# Patient Record
Sex: MALE | Race: OTHER
[De-identification: names, ages, dates, MRNs, and addresses within clinical notes are randomized per-mention and may not be internally consistent; named-entity substitution may affect disease eponyms.]

---

## 2018-02-01 ENCOUNTER — HOSPITAL ENCOUNTER (INPATIENT)
Dept: HOSPITAL 12 - SRC | Age: 23
LOS: 6 days | Discharge: OTHER INSTITUTION | DRG: 895 | End: 2018-02-07
Attending: INTERNAL MEDICINE | Admitting: INTERNAL MEDICINE
Payer: COMMERCIAL

## 2018-02-01 VITALS — SYSTOLIC BLOOD PRESSURE: 117 MMHG | DIASTOLIC BLOOD PRESSURE: 63 MMHG

## 2018-02-01 VITALS — HEIGHT: 74 IN | BODY MASS INDEX: 23.74 KG/M2 | WEIGHT: 185 LBS

## 2018-02-01 VITALS — SYSTOLIC BLOOD PRESSURE: 112 MMHG | DIASTOLIC BLOOD PRESSURE: 70 MMHG

## 2018-02-01 VITALS — SYSTOLIC BLOOD PRESSURE: 110 MMHG | DIASTOLIC BLOOD PRESSURE: 66 MMHG

## 2018-02-01 VITALS — DIASTOLIC BLOOD PRESSURE: 61 MMHG | SYSTOLIC BLOOD PRESSURE: 115 MMHG

## 2018-02-01 VITALS — SYSTOLIC BLOOD PRESSURE: 140 MMHG | DIASTOLIC BLOOD PRESSURE: 74 MMHG

## 2018-02-01 VITALS — DIASTOLIC BLOOD PRESSURE: 65 MMHG | SYSTOLIC BLOOD PRESSURE: 122 MMHG

## 2018-02-01 DIAGNOSIS — E07.81: ICD-10-CM

## 2018-02-01 DIAGNOSIS — Z81.1: ICD-10-CM

## 2018-02-01 DIAGNOSIS — B19.20: ICD-10-CM

## 2018-02-01 DIAGNOSIS — Z59.0: ICD-10-CM

## 2018-02-01 DIAGNOSIS — F17.210: ICD-10-CM

## 2018-02-01 DIAGNOSIS — Z59.1: ICD-10-CM

## 2018-02-01 DIAGNOSIS — F13.232: ICD-10-CM

## 2018-02-01 DIAGNOSIS — I15.9: ICD-10-CM

## 2018-02-01 DIAGNOSIS — Z91.89: ICD-10-CM

## 2018-02-01 DIAGNOSIS — Z81.8: ICD-10-CM

## 2018-02-01 DIAGNOSIS — Z91.14: ICD-10-CM

## 2018-02-01 DIAGNOSIS — F15.23: ICD-10-CM

## 2018-02-01 DIAGNOSIS — Z65.3: ICD-10-CM

## 2018-02-01 DIAGNOSIS — F31.9: ICD-10-CM

## 2018-02-01 DIAGNOSIS — F14.10: ICD-10-CM

## 2018-02-01 DIAGNOSIS — F11.23: Primary | ICD-10-CM

## 2018-02-01 LAB
ALP SERPL-CCNC: 65 U/L (ref 50–136)
ALT SERPL W/O P-5'-P-CCNC: 33 U/L (ref 16–63)
AMPHETAMINES UR QL SCN>1000 NG/ML: NEGATIVE
AMYLASE SERPL-CCNC: 29 U/L (ref 25–115)
AST SERPL-CCNC: 41 U/L (ref 15–37)
BARBITURATES UR QL SCN: POSITIVE
BASOPHILS # BLD AUTO: 0 K/UL (ref 0–8)
BASOPHILS NFR BLD AUTO: 0.3 % (ref 0–2)
BILIRUB SERPL-MCNC: 0.4 MG/DL (ref 0.2–1)
BUN SERPL-MCNC: 8 MG/DL (ref 7–18)
CHLORIDE SERPL-SCNC: 105 MMOL/L (ref 98–107)
CO2 SERPL-SCNC: 25 MMOL/L (ref 21–32)
COCAINE UR QL SCN: POSITIVE
CREAT SERPL-MCNC: 1 MG/DL (ref 0.6–1.3)
EOSINOPHIL # BLD AUTO: 0 K/UL (ref 0–0.7)
EOSINOPHIL NFR BLD AUTO: 1 % (ref 0–7)
ETHANOL SERPL-MCNC: < 3 MG/DL (ref 0–0)
GLUCOSE SERPL-MCNC: 82 MG/DL (ref 74–106)
HCT VFR BLD AUTO: 37.8 % (ref 36.7–47.1)
HGB BLD-MCNC: 12.9 G/DL (ref 12.5–16.3)
LIPASE SERPL-CCNC: 86 U/L (ref 73–393)
LYMPHOCYTES # BLD AUTO: 1.6 K/UL (ref 20–40)
LYMPHOCYTES NFR BLD AUTO: 34.7 % (ref 20.5–51.5)
MAGNESIUM SERPL-MCNC: 1.9 MG/DL (ref 1.8–2.4)
MCH RBC QN AUTO: 27.4 UUG (ref 23.8–33.4)
MCHC RBC AUTO-ENTMCNC: 34 G/DL (ref 32.5–36.3)
MCV RBC AUTO: 80.3 FL (ref 73–96.2)
MONOCYTES # BLD AUTO: 0.3 K/UL (ref 2–10)
MONOCYTES NFR BLD AUTO: 6.8 % (ref 0–11)
NEUTROPHILS # BLD AUTO: 2.7 K/UL (ref 1.8–8.9)
NEUTROPHILS NFR BLD AUTO: 57.2 % (ref 38.5–71.5)
OPIATES UR QL SCN: POSITIVE
PCP UR QL SCN>25 NG/ML: NEGATIVE
PLATELET # BLD AUTO: 237 K/UL (ref 152–348)
POTASSIUM SERPL-SCNC: 3.6 MMOL/L (ref 3.5–5.1)
RBC # BLD AUTO: 4.71 MIL/UL (ref 4.06–5.63)
THC UR QL SCN>50 NG/ML: NEGATIVE
TSH SERPL DL<=0.005 MIU/L-ACNC: 0.1 MIU/ML (ref 0.36–3.74)
WBC # BLD AUTO: 4.7 K/UL (ref 3.6–10.2)
WS STN SPEC: 7 G/DL (ref 6.4–8.2)

## 2018-02-01 PROCEDURE — A4663 DIALYSIS BLOOD PRESSURE CUFF: HCPCS

## 2018-02-01 PROCEDURE — HZ2ZZZZ DETOXIFICATION SERVICES FOR SUBSTANCE ABUSE TREATMENT: ICD-10-PCS | Performed by: INTERNAL MEDICINE

## 2018-02-01 PROCEDURE — G0480 DRUG TEST DEF 1-7 CLASSES: HCPCS

## 2018-02-01 RX ADMIN — DIAZEPAM PRN MG: 10 TABLET ORAL at 09:21

## 2018-02-01 RX ADMIN — BUPRENORPHINE HYDROCHLORIDE SCH MG: 2 TABLET SUBLINGUAL at 14:54

## 2018-02-01 RX ADMIN — FOLIC ACID SCH MG: 1 TABLET ORAL at 09:21

## 2018-02-01 RX ADMIN — BUPRENORPHINE HYDROCHLORIDE SCH MG: 2 TABLET SUBLINGUAL at 20:13

## 2018-02-01 RX ADMIN — DIAZEPAM SCH MG: 10 TABLET ORAL at 18:09

## 2018-02-01 RX ADMIN — QUETIAPINE SCH MG: 200 TABLET, FILM COATED ORAL at 20:12

## 2018-02-01 RX ADMIN — THERA TABS SCH UDTAB: TAB at 09:21

## 2018-02-01 RX ADMIN — DIAZEPAM SCH MG: 10 TABLET ORAL at 14:54

## 2018-02-01 RX ADMIN — IBUPROFEN PRN MG: 600 TABLET, FILM COATED ORAL at 06:13

## 2018-02-01 RX ADMIN — DIAZEPAM SCH MG: 10 TABLET ORAL at 20:12

## 2018-02-01 RX ADMIN — DIAZEPAM PRN MG: 10 TABLET ORAL at 06:13

## 2018-02-01 RX ADMIN — GABAPENTIN SCH MG: 300 CAPSULE ORAL at 20:12

## 2018-02-01 RX ADMIN — DIAZEPAM SCH MG: 10 TABLET ORAL at 11:10

## 2018-02-01 RX ADMIN — BUPRENORPHINE HYDROCHLORIDE SCH MG: 2 TABLET SUBLINGUAL at 18:14

## 2018-02-01 SDOH — ECONOMIC STABILITY - HOUSING INSECURITY: HOMELESSNESS: Z59.0

## 2018-02-01 SDOH — ECONOMIC STABILITY - HOUSING INSECURITY: INADEQUATE HOUSING: Z59.1

## 2018-02-01 NOTE — NUR
START OF SHIFT NOTE:

Patient is a 22 year old male, admitted to [facility] on 02/01/2018 for 
Benzodiazepines, Opioid, and Methamphetamines dependence.  Patient continue 5 Day Valium and 
5 Day Subutex Taper which tolerated well without ASE.  Patient remains compliant with 
treatment, medications, and diet regime.  Patient reports NKA.  Patient is on Full code, 
Regular Diet, Fall and Seizures Precautions.  PMH: Anxiety, Depression, Bipolar Disorder, 
Seizures History, Surgery on" Elbow and fingers".   MRSA done and sent to lab by day shift 
nurse.  Upon endorsement, patient is in his room resting on the bed alert and oriented x4.  
VSWNL. COWS 14, CIWA 27.  Patient  presented with moderate anxiety, moderately fidgety, 
agitation, nervousness, depression, sweating, runny nose, teary eyes, and  severe tremors.  
Doctor MD Maci aware.  Respirations are even and unlabored.  Lung Sounds are clear 
throughout.  Patient denied SOB and chest pain.  Heart rate is regular, no murmur noted.  
Bowel Sounds are active in all four quadrants. Abdomen is soft and non-tender.   Skin is 
intact, warm, and dry to touch.  All needs met.  Safety measures on place.  Call light 
within reach, bed in lowest position and locked, padded rails up bilaterally rails up 
bilaterally.  Patient endorsed by day shift nurse.  Report received.  Will continue to 
monitor closely.

## 2018-02-01 NOTE — NUR
PRN  TORADOL 30 MG/1 ML IM ADMINISTRATION

Patient c/o left arm pain "9/10"and asked aid.  PRN Toradol Inj 30 mg/1 ml IM on the Left 
Deltoid Muscle administrated as ordered.  Patient tolerated well.   All needs met.  Safety 
measures on place.  Call light within reach, bed in lowest position and locked, padded rails 
up bilaterally rails up bilaterally.  Will continue to monitor closely.

## 2018-02-01 NOTE — NUR
END OF SHIFT NOTE 

PATIENT SLEPT 5 HOURS. FLUID INTAKE 30ML. VOIDED  X 2 .NO BM. LAST CIWA 11.  PATIENT WAS 
GIVEN PRN VALIUM AND MOTRIN. PATIENT WOKE UP AND STATED HE DOES NOT FEEL GOOD, PATIENT 
MODERATELY ANXIOUS, IRRITABLE, SWEATING, FINE TREMORS NOTED, AGITATED , RESTLESS LEGS AND  
MUSCLE ACHES. PATIENT RE-ASSESSED TO CONTINUE  ADMISSION PROCESS   BUT STATES HE WANTS TO GO 
BACK TO SLEEP. PATIENT WAS ASKED  REGARDING URINE DRUG SCREEN BEING POSITIVE OF OPIATES, 
BARBITURATES AND COCAINE BUT PATIENT DENIES USING THESE SUBSTANCES, HE STATES HE ONLY USED 
KLONOPIN AND XANAX. CONTINUE ON 1:1 FOR SAFETY. SAFETY MEASURES IN PLACE. CALL LIGHT IN 
REACH. WILL CONTINUE TO MONITOR. LAST CIWA 11.

## 2018-02-01 NOTE — NUR
PRE-ADMISSION

VS BP-140/74 T-98.0 P-87 R- 15 PA-0/10 . SpO2 AT 99% IN RA. PATIENT CAME IN WITH THE FATHER 
. PATIENT NOTED SEDATED, DOZES OFF DURING QUESTIONING, SPEECH  IS SLURRED AND DELAYED IN 
RESPONSE. HE DOES NOT HAVE ALLERGIES TO MEDICATION OR FOOD.  PATIENT CAME IN THE UNIT AT 
0031 IN A WHEELCHAIR.  BODY CHECK DONE, SKIN INTACT. HEIGHT IS 6'2  LBS. PATIENT 
STATES HE'S HERE FOR BENZODIAZEPINES. HE USES KLONOPIN 9 MG DAILY FOR 5 WEEKS , LAST USE 12 
MG  ON 1/31/18  AND XANAX 10MG OR MORE DAILY FOR 5 WEEKS. LAST USE WAS 10-20 MG  PRIOR TO 
ADMISSION .  HE HAS SEIZURE HISTORY-LAST ONE WAS 2 YEARS AGO DUE TO BENZO WITHDRAWAL. 
PATIENT SOMNOLENT AND UNABLE TO PROVIDE ACCURATE  INFORMATION  AT THIS TIME. WILL ASSESS 
PATIENT WHEN ALERT AND ORIENTED. SAFETY MEASURES IN PLACE. CALL LIGHT IN REACH.  ON 1:1 FOR 
SAFETY .

## 2018-02-01 NOTE — NUR
ADMISSION NOTE:



Patient is 23yo male admitted for supervised heroin and alprazolam withdrawal. Alert and 
oriented X4, full code, with NKA. Denies SOB and chest pain.  Patient reports history of 
bipolar history, last seizure was 2 years ago.   at bedside for unsteady 
gait. Denies suicidal and homicidal ideation. CIWA = 13 and COWS 12.  Patient reports 
symptoms such as anxiety, agitation, tremors, nausea and sweating.  Patient has no PCP at 
this time.



Substance use history per patient report:

1)  Klonopin - pt. reports last use was 1/31/18, used 9mgs, for 5 weeks 

2) Xanax - pt. reports last use was 1/31/18, used 10 10-20mg, for 5 weeks 

3) Heroin (IV)  pt. reports last use was 1/31/18, used 1-2g, for 5 weeks

4) Meth (IV)  pt. reports last use was 1/31/18, used 0.5g, for 5 weeks

## 2018-02-01 NOTE — NUR
PRN VALIUM 



Patient's CIWA was 13. PRN valium 10mg po tab given. Will continue to monitr patient.

## 2018-02-01 NOTE — NUR
PRN VALIUM/MOTRIN ADMINISTRATION 

PATIENT WOKE AND STATES HE DOES NOT FEEL GOOD, PATIENT MODERATELY ANXIOUS, IRRITABLE, 
SWEATING, FINE TREMORS NOTED, AGITATED , RESTLESS LEGS AND  MUSCLE ACHES . WILL MONITOR FOR 
EFFECTIVENESS 

-------------------------------------------------------------------------------

Addendum: 02/01/18 at 0629 by SWETA MANCIA LVN

-------------------------------------------------------------------------------

[facility] Sp[facility].

## 2018-02-01 NOTE — NUR
PRN VALIUM 20 MG PO ADMINISTRATION

CIWA 27.  PRN Valium 20 mg PO administrated as ordered.  Patient tolerated well.   All needs 
met.  Safety measures on place.  Call light within reach, bed in lowest position and locked, 
padded rails up bilaterally rails up bilaterally.  Will continue to monitor closely.

## 2018-02-01 NOTE — NUR
PRN VALIUM/MOTRIN RE-ASSESSMENT 

PATIENT IN BED WITH EYES CLOSED. ON 1:1  FOR SAFETY. WILL CONTINUE TO MONITOR

## 2018-02-01 NOTE — NUR
START OF SHIFT



Received report from night shift nurse. Patient is 22 year old male admitted for medically 
supervised withdrawal from heroin and alprazolam. Patient is full code with NKA. On 
assessment this AM: CIWA 13 and COWS: 12. Denies SOB, chest pain. Patient reports anxiety, 
noted agitation, nausea, tremors, sweating.  PRN valium given (CIWA 13).  Patient was 
provided with  upon arrival on the blankenship on previous shift for safety 
unsteady gait for further evaluation this shift. Admission interview was done with patient.

## 2018-02-01 NOTE — NUR
END OF SHIFT 



Patient is 22 year old male admitted for medically supervised withdrawal from heroin and 
alprazolam. Patient is full code with NKA.  Patient on Valium and Subutex taper which were 
started today. Most recent CIWA: 12 and COWS: 9.  Patients vitals signs WNL.  Patient 
reports anxiety, tremors, stuffy nose, chills, body aches, tactile disturbances and mild 
nausea. Compliant with meds this shift. PRN Valium 10mg po given at 09:21am for CIWA 13, 
recheck was 9. Patient was too sleepy for the 13:00pm valium and Subutex doses, MD aware. 
Patient agreed to take it 14:54pm. Patient has poor appetite, refuses his meals, prefers to 
eat snacks at this time. RN night shift will continue to monitor patient.

## 2018-02-02 VITALS — DIASTOLIC BLOOD PRESSURE: 64 MMHG | SYSTOLIC BLOOD PRESSURE: 105 MMHG

## 2018-02-02 VITALS — SYSTOLIC BLOOD PRESSURE: 119 MMHG | DIASTOLIC BLOOD PRESSURE: 77 MMHG

## 2018-02-02 VITALS — SYSTOLIC BLOOD PRESSURE: 93 MMHG | DIASTOLIC BLOOD PRESSURE: 43 MMHG

## 2018-02-02 VITALS — SYSTOLIC BLOOD PRESSURE: 113 MMHG | DIASTOLIC BLOOD PRESSURE: 72 MMHG

## 2018-02-02 VITALS — SYSTOLIC BLOOD PRESSURE: 108 MMHG | DIASTOLIC BLOOD PRESSURE: 66 MMHG

## 2018-02-02 VITALS — SYSTOLIC BLOOD PRESSURE: 106 MMHG | DIASTOLIC BLOOD PRESSURE: 59 MMHG

## 2018-02-02 PROCEDURE — HZ41ZZZ GROUP COUNSELING FOR SUBSTANCE ABUSE TREATMENT, BEHAVIORAL: ICD-10-PCS

## 2018-02-02 RX ADMIN — FOLIC ACID SCH MG: 1 TABLET ORAL at 09:29

## 2018-02-02 RX ADMIN — QUETIAPINE SCH MG: 200 TABLET, FILM COATED ORAL at 21:34

## 2018-02-02 RX ADMIN — BUPRENORPHINE HYDROCHLORIDE SCH MG: 2 TABLET SUBLINGUAL at 14:35

## 2018-02-02 RX ADMIN — GABAPENTIN SCH MG: 300 CAPSULE ORAL at 09:29

## 2018-02-02 RX ADMIN — DIAZEPAM SCH MG: 10 TABLET ORAL at 12:26

## 2018-02-02 RX ADMIN — GABAPENTIN SCH MG: 300 CAPSULE ORAL at 14:35

## 2018-02-02 RX ADMIN — DIAZEPAM SCH MG: 10 TABLET ORAL at 21:34

## 2018-02-02 RX ADMIN — GABAPENTIN SCH MG: 300 CAPSULE ORAL at 21:35

## 2018-02-02 RX ADMIN — BACLOFEN SCH MG: 10 TABLET ORAL at 21:35

## 2018-02-02 RX ADMIN — BUPRENORPHINE HYDROCHLORIDE SCH MG: 2 TABLET SUBLINGUAL at 21:36

## 2018-02-02 RX ADMIN — DIAZEPAM SCH MG: 10 TABLET ORAL at 16:27

## 2018-02-02 RX ADMIN — CLONIDINE HYDROCHLORIDE SCH MG: 0.1 TABLET ORAL at 21:34

## 2018-02-02 RX ADMIN — BUPRENORPHINE HYDROCHLORIDE SCH MG: 2 TABLET SUBLINGUAL at 09:35

## 2018-02-02 RX ADMIN — METHOCARBAMOL TABLETS PRN MG: 750 TABLET, COATED ORAL at 12:25

## 2018-02-02 RX ADMIN — THERA TABS SCH UDTAB: TAB at 09:29

## 2018-02-02 NOTE — NUR
START OF SHIFT



PT IS A 21Y/O M ADMITTED FOR MEDICALLY SUPERVISED BENZO, OPIATE, METH W/D. PT RECEIVED PT 
LAYING IN BED SLEEPING, RESPIRATIONS EVEN AND UNLABORED. SIDE RAILS UPX2 AND PADDED. ALL 
SAFETY MEASURES IN PLACE. CALL LIGHT WITHIN REACH. WILL CONTINUE TO MONITOR.

## 2018-02-02 NOTE — NUR
PT IS AWAKE, ORIENTED X3, RESPIRATIONS EVEN AND UNLABORED. PT REPORTS HAVING CHILLS, 
SWEATING, ANXIETY, GENERALIZED BODY ACHES STOMACH CRAMPS AND RESTLESSNESS. PT HAS A FLAT 
EFFECT, APPEARS ANXIOUS, DEPRESSED. TREMORS AND PILOERECTION OF SKIN SEEN. DENIES N/V/D. 
COWS 12 CIWA 8.

## 2018-02-02 NOTE — NUR
END OF SHIFT NOTE:

Patient is a 22 year old male, admitted to [facility] on 02/01/2018 for 
Benzodiazepines, Opioid, and Methamphetamines dependence.  Patient continue 5 Day Valium and 
 5 Day Subutex Taper which tolerated well without ASE.  Patient remains compliant with 
treatment, medications, and diet regime.  Patient reports NKA.  Patient is on Full code, 
Regular Diet, Fall and Seizures Precautions.  PMH: Anxiety, Depression, Bipolar Disorder, 
Seizures History, Surgery on" Elbow and fingers".   Last COWS 7, CIWA 8  @0400.  Patient 
presented with mild anxiety, agitation, nervousness,  restlessness, barely sweating,  BODY 
ACHES, nasal stuffy/moist eyes, and  tremors.  COWS/CIWA taken when patient's awake during 
night.  Last VS @0400: T:98.1, BP: 105/64, HR:60, RR:16, RA O2Sat:99%, pain level:"0/10".  
Patient denies SI/HI.  Respirations unlabored and even.  Skin is intact, warm and dry to 
touch. PRN Valium 20 mg PO for CIWA 27 administrated @2151 and PRN Toradol IM for pain 
administrated @2119 were effective.  Patient slept 7 hours, intake 1,450 ml, voided x3.  
Encouraged fluids intake as tolerated.  Encouraged to attend groups activities.  Safety 
measures in place: Call Light in reach, bed low, and locked, padded side rails up 
bilaterally.  Needs attended.  Patient endorsed to day shift nurse, report given.

## 2018-02-02 NOTE — NUR
REASSESSMENT



PT REPORTED ZOFRAN WAS EFFECTIVE FOR HIS NAUSEA AND HAS CEASED. WILL CONTINUE TO MONITOR.

## 2018-02-02 NOTE — NUR
REASSESSMENT



PT REPORTED MEDICATION WAS EFFECTIVE FOR MUSCLE CRAMPS AND BODY ACHES. WILL CONTINUE TO 
MONITOR.

## 2018-02-02 NOTE — NUR
START OF SHIFT



Received 22 year old male patient admitted on 2/1/18 for Klonopin, Xanax, Heroin IV and Meth 
IV dependency.  Pt is full code with NKA.  He reports a PMHx of seizure 2 years ago d/t 
benzo withdrawal.  Surgery on elbow and fingers, bipolar and hep C +.  He reports using 
Klonopin 9 mg daily for 5 weeks.  Last dose was 12 mg on 1/31/18.  Xanax 10 mg daily for 5 
weeks.  Last dose 10-20 mg on 1/31/18.  Heroin IV 1-2 grams daily for 5 weeks.  Last dose 
was 1-2 grams on 1/31/18.  and Meth IV 0.5 grams daily for 5 weeks.  Last dose was 0.5 gram 
on 1/31/18.  Pt currently receiving 5 day Valium and 5 day Subutex taper and tolerating 
well.  Per endorsement, pt received PRN Robaxin and Zofran.  Pt currently in room sleeping 
but able to respond to nurses greeting.  Safety measures in place.  Will continue to 
monitor.

## 2018-02-02 NOTE — NUR
Client was prompted by therapist to attend daily group sessions. Client stated that he would 
attend the next group.

## 2018-02-02 NOTE — NUR
PRN 



ROBAXIN 750 MG PO PRN GIVEN FOR CRAMPS AND GENERALIZED BODY ACHES 8/10 PAIN. WILL CONTINUE 
TO MONITOR.

## 2018-02-03 VITALS — SYSTOLIC BLOOD PRESSURE: 100 MMHG | DIASTOLIC BLOOD PRESSURE: 54 MMHG

## 2018-02-03 VITALS — SYSTOLIC BLOOD PRESSURE: 136 MMHG | DIASTOLIC BLOOD PRESSURE: 78 MMHG

## 2018-02-03 VITALS — DIASTOLIC BLOOD PRESSURE: 69 MMHG | SYSTOLIC BLOOD PRESSURE: 117 MMHG

## 2018-02-03 VITALS — DIASTOLIC BLOOD PRESSURE: 68 MMHG | SYSTOLIC BLOOD PRESSURE: 120 MMHG

## 2018-02-03 VITALS — DIASTOLIC BLOOD PRESSURE: 63 MMHG | SYSTOLIC BLOOD PRESSURE: 98 MMHG

## 2018-02-03 VITALS — DIASTOLIC BLOOD PRESSURE: 62 MMHG | SYSTOLIC BLOOD PRESSURE: 108 MMHG

## 2018-02-03 RX ADMIN — QUETIAPINE SCH MG: 200 TABLET, FILM COATED ORAL at 22:14

## 2018-02-03 RX ADMIN — GABAPENTIN SCH MG: 300 CAPSULE ORAL at 15:33

## 2018-02-03 RX ADMIN — BACLOFEN SCH MG: 10 TABLET ORAL at 08:56

## 2018-02-03 RX ADMIN — THERA TABS SCH UDTAB: TAB at 08:56

## 2018-02-03 RX ADMIN — DIAZEPAM SCH MG: 10 TABLET ORAL at 08:56

## 2018-02-03 RX ADMIN — POLYETHYLENE GLYCOL 3350 PRN GM: 17 POWDER, FOR SOLUTION ORAL at 16:44

## 2018-02-03 RX ADMIN — FOLIC ACID SCH MG: 1 TABLET ORAL at 08:56

## 2018-02-03 RX ADMIN — GABAPENTIN SCH MG: 300 CAPSULE ORAL at 22:15

## 2018-02-03 RX ADMIN — DIAZEPAM SCH MG: 10 TABLET ORAL at 22:15

## 2018-02-03 RX ADMIN — CLONIDINE HYDROCHLORIDE SCH MG: 0.1 TABLET ORAL at 21:00

## 2018-02-03 RX ADMIN — GABAPENTIN SCH MG: 300 CAPSULE ORAL at 08:56

## 2018-02-03 RX ADMIN — BUPRENORPHINE HYDROCHLORIDE SCH MG: 2 TABLET SUBLINGUAL at 15:33

## 2018-02-03 RX ADMIN — BUPRENORPHINE HYDROCHLORIDE SCH MG: 2 TABLET SUBLINGUAL at 22:15

## 2018-02-03 RX ADMIN — DIAZEPAM SCH MG: 10 TABLET ORAL at 15:33

## 2018-02-03 RX ADMIN — BACLOFEN SCH MG: 10 TABLET ORAL at 22:15

## 2018-02-03 RX ADMIN — CLONIDINE HYDROCHLORIDE SCH MG: 0.1 TABLET ORAL at 08:57

## 2018-02-03 NOTE — NUR
Start of shift note;



Received report from night nurse. Patient is a 22 year old male admitted on 2/1/18 for 
Benzodiazepine/ Opiate/Methamphetamine. Patient reported history of seizures d/t 
benzodiazepine withdrawals, surgery, bipolar disorder and hepatitis C. Patient is on 5 day 
Valium and 5 day Subutex taper, no adverse reactions. All safety measures secured. Patient 
had uneventful night. Will continue to monitor patient.

## 2018-02-03 NOTE — NUR
Endorsement of Patient

Writer received report/endorsement on 22 year old male admitted to [facility] on 2/1/18 for 
Benzodiazepine, Heroin and Methamphetamine detoxification. Pt reports NKDA, full code and 
eats a regular diet. Pt reports PMH of Hep C, bipolar and a withdrawal related seizure 2 
years ago. Pt currently on a Valium and Subutex taper, tolerating well. Last COWS 8, CIWA 5 
as per report. Pt was negative for MRSA. No PRN medication provided. Writer encounters pt in 
room resting with eyes closed and even, unlabored  respirations. Rise and fall of chest 
noted. Bed in low position, wheels locked and side rails up x2. Will continue to monitor, 
support and encourage according to plan of care.

## 2018-02-03 NOTE — NUR
Start of shift note

Received report from day shift nurse.  Pt is a 23 yo male, A+Ox4, presenting to [facility] for Benzo/Opiate/Meth dependence.  Pt has NKA, is on Full code status, and on 
Regular diet.  Pt is on Fall and Seizure precautions.  Pt has HX of Seizure, Hep C, Elbow 
and fingers SX's, and Bipolar disorder.  Pt is on 5 day Valium and 5 day Subutex tapers, 
tolerated well.  No s/s of distress noted at this time.  Respirations even and unlabored.  
Will continue to monitor.

## 2018-02-03 NOTE — NUR
Report given;



Detailed report given to covering nurse. Patient is AOX4, last COWS is 8 and last CIWA is 5 
at 0800. All morning medications given as ordered. All safety measures secured. Endorsed 
patient to covering nurse.

## 2018-02-03 NOTE — NUR
PRN Re-Assessment

Pt reports, no BM. Pt educated on importance of keeping staff aware of BM and need for 
continued monitoring. Will continue to monitor, support and encourage according to plan of 
care.

## 2018-02-03 NOTE — NUR
PRN Miralax

Pt complains of constipation, with no bowel movement in 3 days, " it's that damn Subutex, 
clogged me up." Writer administered medication per MD order with pt tolerating well. Pt 
educated on need to keep staff aware of bowel movements. Will continue to monitor, support 
and encourage according to plan of care.

## 2018-02-03 NOTE — NUR
END OF SHIFT



Pt is lying in bed with eyes closed and is asleep.  He continues on a 5 day Valium and 5 day 
Subutex taper.  He did not receive or request any PRN medications.  He slept a total of 
9hrs, Intake: 793mL, Void: x1, BM:0, COWS:6, CIWA:5.  He continues to be alert and oriented 
x4  with no significant changes.  Breathing is even and unlabored.  Safety measures in 
place.  Endorsed to AM shift.

## 2018-02-04 VITALS — SYSTOLIC BLOOD PRESSURE: 122 MMHG | DIASTOLIC BLOOD PRESSURE: 80 MMHG

## 2018-02-04 VITALS — SYSTOLIC BLOOD PRESSURE: 124 MMHG | DIASTOLIC BLOOD PRESSURE: 72 MMHG

## 2018-02-04 VITALS — SYSTOLIC BLOOD PRESSURE: 125 MMHG | DIASTOLIC BLOOD PRESSURE: 64 MMHG

## 2018-02-04 VITALS — DIASTOLIC BLOOD PRESSURE: 74 MMHG | SYSTOLIC BLOOD PRESSURE: 130 MMHG

## 2018-02-04 VITALS — DIASTOLIC BLOOD PRESSURE: 54 MMHG | SYSTOLIC BLOOD PRESSURE: 104 MMHG

## 2018-02-04 VITALS — DIASTOLIC BLOOD PRESSURE: 82 MMHG | SYSTOLIC BLOOD PRESSURE: 147 MMHG

## 2018-02-04 PROCEDURE — HZ31ZZZ INDIVIDUAL COUNSELING FOR SUBSTANCE ABUSE TREATMENT, BEHAVIORAL: ICD-10-PCS

## 2018-02-04 RX ADMIN — BACLOFEN SCH MG: 10 TABLET ORAL at 09:43

## 2018-02-04 RX ADMIN — BACLOFEN SCH MG: 10 TABLET ORAL at 20:14

## 2018-02-04 RX ADMIN — IBUPROFEN PRN MG: 600 TABLET, FILM COATED ORAL at 20:16

## 2018-02-04 RX ADMIN — GABAPENTIN SCH MG: 300 CAPSULE ORAL at 09:41

## 2018-02-04 RX ADMIN — BUPRENORPHINE HYDROCHLORIDE SCH MG: 2 TABLET SUBLINGUAL at 09:43

## 2018-02-04 RX ADMIN — DIAZEPAM SCH MG: 5 TABLET ORAL at 20:15

## 2018-02-04 RX ADMIN — BUPRENORPHINE HYDROCHLORIDE SCH MG: 2 TABLET SUBLINGUAL at 14:42

## 2018-02-04 RX ADMIN — QUETIAPINE SCH MG: 200 TABLET, FILM COATED ORAL at 20:15

## 2018-02-04 RX ADMIN — CLONIDINE HYDROCHLORIDE SCH MG: 0.1 TABLET ORAL at 20:14

## 2018-02-04 RX ADMIN — BUPRENORPHINE HYDROCHLORIDE SCH MG: 2 TABLET SUBLINGUAL at 20:15

## 2018-02-04 RX ADMIN — DIAZEPAM SCH MG: 5 TABLET ORAL at 09:43

## 2018-02-04 RX ADMIN — DIAZEPAM SCH MG: 5 TABLET ORAL at 14:41

## 2018-02-04 RX ADMIN — METHOCARBAMOL TABLETS PRN MG: 750 TABLET, COATED ORAL at 12:58

## 2018-02-04 RX ADMIN — GABAPENTIN SCH MG: 300 CAPSULE ORAL at 20:16

## 2018-02-04 RX ADMIN — THERA TABS SCH UDTAB: TAB at 09:43

## 2018-02-04 RX ADMIN — POLYETHYLENE GLYCOL 3350 PRN GM: 17 POWDER, FOR SOLUTION ORAL at 09:43

## 2018-02-04 RX ADMIN — CLONIDINE HYDROCHLORIDE SCH MG: 0.1 TABLET ORAL at 09:42

## 2018-02-04 RX ADMIN — GABAPENTIN SCH MG: 300 CAPSULE ORAL at 14:41

## 2018-02-04 RX ADMIN — FOLIC ACID SCH MG: 1 TABLET ORAL at 09:43

## 2018-02-04 NOTE — NUR
End of shift note

Pt is a 23 yo male, A+Ox4, presenting to [facility] for Benzo/Opiate/Meth dependence. 
 Pt has NKA, is on Full code status, and on Regular diet.  Pt is on Fall and Seizure 
precautions.  Pt has HX of Seizure, Hep C, Elbow and fingers SX's, and Bipolar disorder.  Pt 
is on 5 day Valium and 5 day Subutex tapers, tolerated well.  Pt slept for a total of 7 HRS. 
 Last COWS: 3 and Last CIWA: 2 @0400.  No s/s of distress noted at this time.  Respirations 
even and unlabored.  Will endorse to day shift nurse.

## 2018-02-04 NOTE — NUR
Reasses- Pt reports minimal anxiety relief from Catapres. Myalgia relief from Baclofen, and 
Pt states he is feeling a little better. COWS 7, CIWA7.

-------------------------------------------------------------------------------

Addendum: 02/04/18 at 1808 by Marquita Parr RN

-------------------------------------------------------------------------------

Myalgia relief from Robaxin, not Baclofen. at 1400

## 2018-02-04 NOTE — NUR
pt lying on bed in room watching Tv denies of any distress. Endorsed care to Munir.


-------------------------------------------------------------------------------

Addendum: 02/04/18 at 2248 by Tan Peterson LVN

-------------------------------------------------------------------------------

Endorsed Care to FARHAD Escobar

## 2018-02-04 NOTE — NUR
End of shift note-  Pt is a 23 y/o male presenting to [facility] for 
Benzo/Opiate/Meth dependence.  Pt A&O x4. Pt on 5 day Valium and Subutex taper, started on 
2/1/18, tolerating well.  PRN Robaxin and Catapres given at 1200 with minimal effect. TB 
test read and was negative. At 1600 last COWS 5, CIWA 4. NKA, Full code and Regular diet.  
Pt is on Fall and Seizure precautions.  Pt has HX of Seizure, Hep C, Elbow and fingers SX's, 
and Bipolar disorder.    Adequate PO fluid intake of 3843 ml, voids X 4, BM X1. Safety 
measures in place.  Safety measures in place. Bed lowest/locked position, side rails up 
X2/padded, call light within reach. Will endorse to oncoming nurse.

## 2018-02-04 NOTE — NUR
RESUMED CARE



Received endorsement. Pt in room, lying comfortably in bed watching TV.  Safety measures in 
place. Will continue to monitor.

## 2018-02-04 NOTE — NUR
Start of Shift:

A/O x4 gait is stable lying on bed watching tv CIWA 6 COW 8 c/o itching, tremors, joint pain 
and rhinorrhea. Given prn Ibuprofen for 5-10 joint pain.  On Day 4-5 Valium & Subutex 
tamper. Continue to mointor for sz's, falls and safety. Medication compliant, visable on 
unit and cooperative with staff.

## 2018-02-04 NOTE — NUR
Start of shift-  Pt is a 23 y/o male presenting to [facility] for Benzo/Opiate/Meth 
dependence.  No s/s of distress noted at this time.  Pt appears to be sleeping in bed. 
Respirations even and unlabored. Pt slept 7 hours last night. At 0400 last COWS 3, CIWA 2. 
NKA, Full code and Regular diet.  Pt is on Fall and Seizure precautions.  Pt has HX of 
Seizure, Hep C, Elbow and fingers SX's, and Bipolar disorder.  MRSA lab negative. Pt is on 5 
day Valium and 5 day Subutex tapers started on 2/1/18, tolerating well.  Safety measures in 
place. Will continue to monitor.

## 2018-02-04 NOTE — NUR
Therapist prompted client about group times. Client stated he would try to attend all groups 
today.

## 2018-02-05 VITALS — SYSTOLIC BLOOD PRESSURE: 100 MMHG | DIASTOLIC BLOOD PRESSURE: 59 MMHG

## 2018-02-05 VITALS — DIASTOLIC BLOOD PRESSURE: 88 MMHG | SYSTOLIC BLOOD PRESSURE: 124 MMHG

## 2018-02-05 VITALS — SYSTOLIC BLOOD PRESSURE: 132 MMHG | DIASTOLIC BLOOD PRESSURE: 64 MMHG

## 2018-02-05 VITALS — DIASTOLIC BLOOD PRESSURE: 96 MMHG | SYSTOLIC BLOOD PRESSURE: 143 MMHG

## 2018-02-05 VITALS — SYSTOLIC BLOOD PRESSURE: 120 MMHG | DIASTOLIC BLOOD PRESSURE: 62 MMHG

## 2018-02-05 RX ADMIN — BUPRENORPHINE HYDROCHLORIDE SCH MG: 2 TABLET SUBLINGUAL at 09:29

## 2018-02-05 RX ADMIN — FOLIC ACID SCH MG: 1 TABLET ORAL at 09:30

## 2018-02-05 RX ADMIN — CLONIDINE HYDROCHLORIDE SCH MG: 0.1 TABLET ORAL at 21:10

## 2018-02-05 RX ADMIN — CLONIDINE HYDROCHLORIDE SCH MG: 0.1 TABLET ORAL at 09:30

## 2018-02-05 RX ADMIN — QUETIAPINE SCH MG: 200 TABLET, FILM COATED ORAL at 21:06

## 2018-02-05 RX ADMIN — DIAZEPAM SCH MG: 5 TABLET ORAL at 09:29

## 2018-02-05 RX ADMIN — POLYETHYLENE GLYCOL 3350 PRN GM: 17 POWDER, FOR SOLUTION ORAL at 15:31

## 2018-02-05 RX ADMIN — GABAPENTIN SCH MG: 300 CAPSULE ORAL at 15:31

## 2018-02-05 RX ADMIN — GABAPENTIN SCH MG: 300 CAPSULE ORAL at 21:05

## 2018-02-05 RX ADMIN — DIAZEPAM SCH MG: 5 TABLET ORAL at 21:06

## 2018-02-05 RX ADMIN — BACLOFEN SCH MG: 10 TABLET ORAL at 21:06

## 2018-02-05 RX ADMIN — GABAPENTIN SCH MG: 300 CAPSULE ORAL at 09:29

## 2018-02-05 RX ADMIN — BACLOFEN SCH MG: 10 TABLET ORAL at 09:30

## 2018-02-05 RX ADMIN — THERA TABS SCH UDTAB: TAB at 09:29

## 2018-02-05 RX ADMIN — BUPRENORPHINE HYDROCHLORIDE SCH MG: 2 TABLET SUBLINGUAL at 21:10

## 2018-02-05 NOTE — NUR
Start Of Shift 



Received report from night shift nurse. Pt is a 22 year old male admitted on 02/01/18 for 
ETOH , Benzo and meth dependency.  Pt is full code reports no allergies. PMHx of seizures 
last one 2 years ago, Bipolar and Hep C+.  Pt is on his last day of the 5 day Valium and 
Subutex taper, his last CIWA was a 6 and last COWS was a 8 at 0400. Pt is alert and 
orientedX4 Ambulatory. Pt received PRN Motrin which was effective. Pt encouraged to drink 
plenty of fluids to help facilitate the detox process. All safety measures in place, call 
light within reach, bed in lowest locked position. Will continue to monitor pt and provide 
care.

## 2018-02-05 NOTE — NUR
START OF SHIFT NOTE 

RECEIVED REPORT FROM DAY SHIFT NURSE. PATIENT IS A 22 YEAR OLD MALE ADMITTED FOR 
BENZO/OPIATE AND METH DEPENDENCE. PATIENT IS ON 5TH DAY OF VALIUM AND SUBUTEX TAPER, 
TOLERATED WELL AND NO ADVERSE REACTION.  PATIENT REPORTS PMH OF BIPOLAR, HEP C +, SURGERY ON 
LEFT ELBOW AND FINGERS AND SEIZURE (LAST ONE WAS 2 YEARS AGO D/T BENZO W/D. SKIN INTACT. 
PATIENT WAS GIVEN PRN MIRALAX. LAST COWS 4 AND CIWA 5. RECEIVED PATIENT ALERT AND ORIENTED X 
4. RESPIRATION EVEN AND UNLABORED. PATIENT REPORTS ANXIETY, SWEATING, STUFFY NOSE , 
ABDOMINAL CRAMPING, NO N/V, DENIES ANY PAIN AT THIS TIME.  ON FALL/SEIZURE PRECAUTION. WILL 
CONTINUE TO MONITOR.

## 2018-02-05 NOTE — NUR
VITALS REFUSED, COWS/CIWA DEFERRED



0400 vitals refused.  COWS and CIWA deferred d/t pt lying in bed with eyes closed noted to 
be asleep.  Safety measures in place.  Will continue to monitor.

## 2018-02-05 NOTE — NUR
END OF SHIFT



Pt is lying in bed with eyes closed and is noted to be asleep.  He complained of headache 
and received PRN Motrin for pain.  He continues on 5 day Valium and 5 day Subutex taper.  He 
is currently on day 5/5 and tolerating well.  He slept a total of 6 hrs, Intake:1834 mL, 
Void:x3, BM:0, COWS: 8, CIWA:6.  Pt remains alert and oriented x4, breathing even and 
unlabored.  Safety measures in place.  Will endorse to AM shift.

## 2018-02-05 NOTE — NUR
End Of Shift 

Pt is a 22 year old male admitted on 02/01/18 for ETOH , Benzo and meth dependency.  Pt is 
full code reports no allergies. PMHx of seizures last one 2 years ago, Bipolar and Hep C+.  
Pt is on his last day of the 5 day Valium and 5 day Subutex taper, VS monitored closely q 4 
hours. Withdrawal symptoms were closely monitored. Initial COWS 6 CIWA 6. Patient encouraged 
adequate PO fluid intake as tolerated. Patient presented with tremors and anxiety during the 
day. Last COWS 4 CIWA 5. Per patient, Subutex and Valium have been helping him with his 
withdrawal symptoms. Pt ate all of his meals.  No Pt received PRN miralax endorsed to night 
shift to ask about BM.  Patient encouraged to attend group therapies/sessions to learn new 
coping skills to recent relapse, patient denies SI/HI. Participated in group and therapy 
sessions. All needs met and attended

## 2018-02-06 VITALS — SYSTOLIC BLOOD PRESSURE: 112 MMHG | DIASTOLIC BLOOD PRESSURE: 72 MMHG

## 2018-02-06 VITALS — SYSTOLIC BLOOD PRESSURE: 108 MMHG | DIASTOLIC BLOOD PRESSURE: 53 MMHG

## 2018-02-06 VITALS — DIASTOLIC BLOOD PRESSURE: 48 MMHG | SYSTOLIC BLOOD PRESSURE: 106 MMHG

## 2018-02-06 VITALS — DIASTOLIC BLOOD PRESSURE: 72 MMHG | SYSTOLIC BLOOD PRESSURE: 108 MMHG

## 2018-02-06 RX ADMIN — POLYETHYLENE GLYCOL 3350 PRN GM: 17 POWDER, FOR SOLUTION ORAL at 09:56

## 2018-02-06 RX ADMIN — QUETIAPINE SCH MG: 200 TABLET, FILM COATED ORAL at 20:31

## 2018-02-06 RX ADMIN — FOLIC ACID SCH MG: 1 TABLET ORAL at 09:56

## 2018-02-06 RX ADMIN — THERA TABS SCH UDTAB: TAB at 09:57

## 2018-02-06 RX ADMIN — GABAPENTIN SCH MG: 300 CAPSULE ORAL at 14:59

## 2018-02-06 RX ADMIN — BACLOFEN SCH MG: 10 TABLET ORAL at 20:31

## 2018-02-06 RX ADMIN — CLONIDINE HYDROCHLORIDE SCH MG: 0.1 TABLET ORAL at 09:57

## 2018-02-06 RX ADMIN — BACLOFEN SCH MG: 10 TABLET ORAL at 09:57

## 2018-02-06 RX ADMIN — GABAPENTIN SCH MG: 300 CAPSULE ORAL at 09:57

## 2018-02-06 RX ADMIN — CLONIDINE HYDROCHLORIDE SCH MG: 0.1 TABLET ORAL at 20:32

## 2018-02-06 RX ADMIN — GABAPENTIN SCH MG: 300 CAPSULE ORAL at 20:31

## 2018-02-06 NOTE — NUR
COWS AND CIWA DEFERRED 

PATIENT REFUSED VS. RESPIRATION EVEN AND UNLABORED. SAFETY MEASURES IN PLACE. CALL LIGHT IN 
REACH. WILL CONTINUE TO MONITOR

## 2018-02-06 NOTE — NUR
END OF SHIFT NOTE 

PATIENT SLEPT 5 HOURS. FLUID INTAKE 1,296 ML. VOIDED X 2 . NO BM. PATIENT CONTINUE ON VALIUM 
AND SUBUTEX TAPER, TOLERATED WELL AND NO ADVERSE REACTION.  PATIENT ALERT AND ORIENTED X 
4,RESPIRATION EVEN AND UNLABORED, HE REPORTED ANXIETY, SWEATING, STUFFY NOSE , ABDOMINAL 
CRAMPING, NO N/V, DENIES ANY PAIN BEGINNING OF SHIFT. PATIENT REQUESTED LAXATIVE, PRN MOM 
GIVEN. ENCOURAGE FLUIDS. PATIENT COMPLIANT WITH MEDICATION AND TREATMENT PLAN.  ON 
FALL/SEIZURE PRECAUTION. WILL CONTINUE TO MONITOR.  LAST COWS 5 AND CIWA 3.

## 2018-02-06 NOTE — NUR
End of Shift

Writer provided  report  on 22 year old male admitted to [facility] on 2/1/18 for 
Benzodiazepine, Heroin and Methamphetamine detoxification. Pt reports NKDA, full code and 
eats a regular diet. Pt reports PMH of  bipolar and a withdrawal related seizure 2 years 
ago. Pt has tested positive for Hep C. Pt has completed the Valium and Subutex tapers, 
tolerated well.  Last COWS 3, CIWA 2 recorded at 1600.Pt was administered Miralax PRN after 
pt made complaint of no BM x2 days. No report of any BM today.  Pt has been visible on the 
unit, social with staff and peers. A/O x4 and makes his needs known. Bright affect with 
congruent mood. Pt is manipulative and entitled. Calm, cooperative and pleasant. Pt is 
preparing to discharge tomorrow. Pt was found with pill remnants  wrapped in paper in his 
dresser drawer. MD and administration aware.  Bed in low position, wheels locked and side 
rails up x2. Will continue to monitor, support and encourage according to plan of care.

## 2018-02-06 NOTE — NUR
PRN Miralax

Pt requests Miralax due to constipation. States he hasn't had a BM in 2 days. Medication 
administered per MD order with pt tolerating well. Will continue to monitor, support and 
encourage according to plan of care.

## 2018-02-06 NOTE — NUR
Start of Shift

Patient Received. Patient is in bed sleeping but easily aroused to verbal stimuli. Breathing 
even and non labored. Patient is a 22 year old male admitted on 2/1/18 and is set for 
discharge tomorrow 2/7/18. Patient completed a 5 day Valium and 5 day Subutex taper. Per 
endorsement, patient was informed of new Hep C by MD diagnosis with proper counseling and 
education provided. No PRN medications administered. Last noted COWS 3 and CIWA 2. All needs 
attended to promptly. Will continue plan of care as ordered.

## 2018-02-07 VITALS — DIASTOLIC BLOOD PRESSURE: 99 MMHG | SYSTOLIC BLOOD PRESSURE: 132 MMHG

## 2018-02-07 VITALS — DIASTOLIC BLOOD PRESSURE: 89 MMHG | SYSTOLIC BLOOD PRESSURE: 132 MMHG

## 2018-02-07 VITALS — SYSTOLIC BLOOD PRESSURE: 107 MMHG | DIASTOLIC BLOOD PRESSURE: 63 MMHG

## 2018-02-07 VITALS — SYSTOLIC BLOOD PRESSURE: 103 MMHG | DIASTOLIC BLOOD PRESSURE: 70 MMHG

## 2018-02-07 RX ADMIN — CLONIDINE HYDROCHLORIDE SCH MG: 0.1 TABLET ORAL at 08:45

## 2018-02-07 RX ADMIN — THERA TABS SCH UDTAB: TAB at 08:45

## 2018-02-07 RX ADMIN — FOLIC ACID SCH MG: 1 TABLET ORAL at 08:45

## 2018-02-07 RX ADMIN — BACLOFEN SCH MG: 10 TABLET ORAL at 08:44

## 2018-02-07 RX ADMIN — GABAPENTIN SCH MG: 300 CAPSULE ORAL at 08:45

## 2018-02-07 NOTE — NUR
DISCHARGE NOTE



PT WAS DISCHARGED IN STABLE CONDITION, A/O X4, VVS, DENIES SI/HI. LAST COWS 3, CIWA 3. PT 
SIGNED ALL D/C PAPERWORK AND COPIED. D/C INSTRUCTION GIVEN TO PT AND VERBALIZED 
UNDERSTANDING. PT HAD NO HOME MEDS. MD AWARE AND NOTIFIED OF PT D/C. PT LEFT THE BUILDING 
WITH ALL BELONGINGS, PRESCRIPTIONS, D/C PAPERWORK AT 1135 ON 2/07/18. PT IS PICKED UP BY 
WILFRED MORATAYA AND TAKEN TO [facility].

## 2018-02-07 NOTE — NUR
End of Shift 

Patient is in bed sleeping. Breathing even and non labored. No signs of restlessness or 
discomfort noted. Patient is set for discharge today 2/7/18. No PRN Medications 
administered. Patient was able to participate in social activities prior to bed and was able 
to verbalize positive coping skills to maintain sober lifestyle. Last noted COWS 3 and CIWA 
3. All needs attended to promptly. Will endorse to continue plan of care as ordered.

## 2018-02-07 NOTE — NUR
START OF SHIFT



RECEIVED PT LAYING IN BED, A/OX4, RESPIRATIONS EVEN AND UNLABORED. PT REPORTS HAVING 
SWEATING, CHILLS, GENERALIZED BODY ACHES. PT IS TO BE DISCHARGED TODAY. SIDE RAILS UP X2, 
BED IS IN LOWEST POSITION. CALL LIGHT WITHIN REACH. WILL CONTINUE TO MONITOR.

## 2018-06-04 NOTE — NUR
PT BIBRA PER RA PT "FOUND PASSED OUT IN HIS CAR AFTER SHOOTING HEROIN; GIVEN 
2MG NARCAN INTRANASAL PTA. PT AOX3 RR EVEN AND UNLABORED. NO SOB NOTED. NAD 
NOTED. NO NVD AT THIS TIME. PT GOWNED AND PLACED ON MONITOR. DR. HANDY AT 
BEDSIDE FOR EVAL.

## 2018-06-29 ENCOUNTER — HOSPITAL ENCOUNTER (INPATIENT)
Dept: HOSPITAL 12 - SRC | Age: 23
Discharge: LEFT BEFORE DISCHARGE | DRG: 894 | End: 2018-06-29
Attending: FAMILY MEDICINE | Admitting: FAMILY MEDICINE
Payer: COMMERCIAL

## 2018-06-29 DIAGNOSIS — F19.239: Primary | ICD-10-CM

## 2018-06-29 DIAGNOSIS — Z75.3: ICD-10-CM

## 2018-06-30 ENCOUNTER — HOSPITAL ENCOUNTER (INPATIENT)
Dept: HOSPITAL 12 - SRC | Age: 23
LOS: 7 days | Discharge: HOME | DRG: 895 | End: 2018-07-07
Attending: INTERNAL MEDICINE | Admitting: FAMILY MEDICINE
Payer: COMMERCIAL

## 2018-06-30 VITALS — BODY MASS INDEX: 27.09 KG/M2 | WEIGHT: 200 LBS | HEIGHT: 72 IN

## 2018-06-30 VITALS — SYSTOLIC BLOOD PRESSURE: 114 MMHG | DIASTOLIC BLOOD PRESSURE: 62 MMHG

## 2018-06-30 VITALS — DIASTOLIC BLOOD PRESSURE: 65 MMHG | SYSTOLIC BLOOD PRESSURE: 139 MMHG

## 2018-06-30 DIAGNOSIS — F15.20: ICD-10-CM

## 2018-06-30 DIAGNOSIS — I80.8: ICD-10-CM

## 2018-06-30 DIAGNOSIS — F10.239: ICD-10-CM

## 2018-06-30 DIAGNOSIS — F13.20: ICD-10-CM
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
## 2018-06-30 DIAGNOSIS — E86.0: ICD-10-CM

## 2018-06-30 DIAGNOSIS — K59.00: ICD-10-CM

## 2018-06-30 DIAGNOSIS — F11.23: Primary | ICD-10-CM

## 2018-06-30 DIAGNOSIS — S61.531S: ICD-10-CM

## 2018-06-30 DIAGNOSIS — F31.60: ICD-10-CM

## 2018-06-30 DIAGNOSIS — F12.20: ICD-10-CM

## 2018-06-30 DIAGNOSIS — L03.113: ICD-10-CM

## 2018-06-30 DIAGNOSIS — Z79.899: ICD-10-CM

## 2018-06-30 LAB
AMPHETAMINES UR QL SCN>1000 NG/ML: POSITIVE
BARBITURATES UR QL SCN: NEGATIVE
COCAINE UR QL SCN: NEGATIVE
OPIATES UR QL SCN: POSITIVE
PCP UR QL SCN>25 NG/ML: NEGATIVE
THC UR QL SCN>50 NG/ML: POSITIVE

## 2018-06-30 PROCEDURE — HZ31ZZZ INDIVIDUAL COUNSELING FOR SUBSTANCE ABUSE TREATMENT, BEHAVIORAL: ICD-10-PCS

## 2018-06-30 PROCEDURE — HZ2ZZZZ DETOXIFICATION SERVICES FOR SUBSTANCE ABUSE TREATMENT: ICD-10-PCS | Performed by: INTERNAL MEDICINE

## 2018-06-30 PROCEDURE — A4663 DIALYSIS BLOOD PRESSURE CUFF: HCPCS

## 2018-06-30 PROCEDURE — G0480 DRUG TEST DEF 1-7 CLASSES: HCPCS

## 2018-06-30 RX ADMIN — QUETIAPINE SCH MG: 200 TABLET, FILM COATED ORAL at 21:59

## 2018-06-30 RX ADMIN — ARIPIPRAZOLE SCH MG: 5 TABLET ORAL at 21:59

## 2018-06-30 NOTE — NUR
CIWA AND COWS DEFERRED



Pt is in bed w/ his eyes closed. Pt's respirations are even and unlabored. Pt is arousable, 
but very lethargic.

## 2018-06-30 NOTE — NUR
END OF SHIFT:

PT IS A NEW ADMIT. HE WAS AGITATED AND ANXIOUS AND VALIUM 10 MG PO X 1 WAS GIVEN  PER MD  
AND NOT EFFECTIVE. PT COMPLAINED THAT HE FELT EXTREMELY ANXIOUS AND AGITATED AND ATIVAN 2 MG 
PO X 1 GIVEN PER MD AS CIWA SCORE WENT FROM 12 TO 7. HE HAS BEEN ASLEEP WITH RESPIRATIONS 
EVEN AND UNLABORED CALL PLATT IN REACH. WILL PASS SHIFT REPORT TO ONCOMING NIGHT NURSE. 
SUBUTEX/VALIUM TAPER TO START IN AM WITH PRNS AVAILABLE.

## 2018-06-30 NOTE — NUR
PT STATES THE VALIUM WAS NOT EFFECTIVE IN REDUCING S/S OF BZO W/D. CIWA REMAINS 9. WILL 
DISCUSS WITH MD.

## 2018-06-30 NOTE — NUR
COWS AND CIWA DEFERRED AS PT IS ASLEEP.RESPIRATIONS EVEN AND UNLABORED. WILL CONTINUE TO 
MONITOR AND OFFER SUPPORT.

## 2018-06-30 NOTE — NUR
PT STATES HE FEELS  A LITTLE BETTER. HE IS CALM AND LAYING IN BED A/O X 4. CIWA 7. ATIVAN 
WAS EFFECTIVE. WILL CONTINUE TO MONITOR AND MANAGE S/S OF W/D.

## 2018-06-30 NOTE — NUR
START OF SHIFT NOTE+



Rcvd report from outgoing nurse, pt is in his room. Pt is a 24 y/o male A/O to person, 
place, time, and purpose. Pt was admitted for medically supervised withdrawal from ETOH and 
Benzodiazepines. Pts last Heroin use was this am. Pt was intoxicated upon admission. Pt has 
been presenting w/ anxiety, agitation, and emotional volatility. Pt, during previous shift, 
was yelling at staff c/o anxiety. Pt refused to have his blood work taken during previous 
shift. Pt denies S/I and H/I. PRN Valium 10mg and Ativan 2mg was given for increased anxiety 
and agitation, noted effective. Last CIWA 7 @ 1330. Call light is within reach. Pt will 
continue to be monitored and needs met.

## 2018-06-30 NOTE — NUR
PRE ASSESSMENT:

PT IS IN INTAKE. HIS EYES CLOSE DURING ASSESSMENT AND NODDING OFF. POOR EYE CONTACT NOTED. 
SLIGHTLY SLURRED SPEECH. PT STATES HE USED 1 GM OF HEROIN LATE LAST NIGHT AND 1.5 GM THIS AM 
 IV AT 0900. HE ALSO USED 1 GM OF METH THIS AM AT 0900. HE RELAPSED 1 MONTH AGO AND ALSO 
USES KLONOPIN 8-12 MG DAILY PO X 3 WEEKS. HE LAST USED KLONOPIN ON THURSDAY NIGHT 6/28. HE 
USES 2 .5 GM OF HEROIN IV DAILY X 4 WEEKS. HE USES 1 GM OF METHAMPHETAMINE DAILY IV X 4 
WEEKS . SZ HX FROM [facility] W/D IN NOV. 2016. HE REPORTS BIPOLAR DISORDER AND DEPRESSION. HE 
TAKES ABILIFY WHICH HE BROUGHT INTO FACILITY. HE WAS AT [facility] IN FEB 2018 AND STAYED 
CLEAN UNTIL 1 MONTH AGO. WILL ASSESS ON UNIT.

## 2018-06-30 NOTE — NUR
PT IS AGITATED AND COMPLAINING OF INCREASED ANXIETY BORDERING PANIC. HE IS FIDGETY AND 
RESTLESS.  CIWA 12. ATIVAN 2 MG PO X 1 GIVEN PER MD. WILL MONITOR EFFECTIVENESS OF 
MEDICATION

## 2018-06-30 NOTE — NUR
ADMISSION:

A 22 YO MALE ADMITTED FOR MEDICALLY SUPERVISED WITHDRAWAL OF BENZO AND OPIATE WITHDRAWAL.A/O 
X 4  POOR EYE CONTACT NOTED. HE IS RESTLESS AND BECOMES EASILY AGITATED ON ASSESSMENT.HIS 
MOOD IS ANGRY AND AFFECT CONGRUENT. HE REPORTS USING 8-12 MG OF KLONOPIN DAILY X 3 WEEKS. 
LAST USED 10 MG ON 6/28 IN PM. HE REPORTS USING HEROIN IV 2.5 GM DAILY. LAST USED HEROIN 1.5 
GM THIS AM AT 0900. HE ALSO REPORTS USING METHAMPHETAMINE 1 GM IV DAILY. LAST USED 1 GM THIS 
AM AT 0900. HE LATER REPORTED HE DRINKS VODKA A COUPLE OF TIMES WEEKLY BUT DOES NOT WANT TO 
ANSWER HOW MUCH ALCOHOL WHEN HE DRINKS.  HE STATES HE WAS CLEAN AND SOBER FROM FEB-MAY 2018  
AND HIS RELAPSE WAS TRIGGERED BY RESENTMENTS THAT HE CARRIES TOWARD OTHER MEMBERS OF AA AND 
NEGATIVE EMOTIONS THAT HE FELT UNABLE TO MANAGE WITHOUT DRUGS.  PT RAISES HIS VOICE DURING 
ASSESSMENT AND DEMANDS MEDS AT ONCE. HE STATES HE FEELS IRRITABLE,AGITATED AND FEELS LIKE HE 
IS GOING TO HAVE A PANIC ATTACK.OFFERED SUPPORT.VALIUM 10 MG GIVEN  X 1.PT STATES IT IS NOT 
GOING TO WORK FAST ENOUGH. PT DENIES ALLERGIES. HE REPORTS HX OF BIPOLAR DISORDER AND 
DEPRESSION AND HE TAKES ABILIFY AND BROUGHT IT INTO FACILITY. ORIENTED PT TO STAFF AND 
FACILITY. PT IS THREATENING TO LEAVE BECAUSE HE IS NOT GETTING ENOUGH MEDS. [facility] 9  SZ 
HISTORY FROM [facility] W/D IN NOV 2016. HE DENIES A PCP. HE DENIES S/I AND H/I. WILL CONTINUE TO 
MONITOR AND MANAGE S/S OF W/D.

-------------------------------------------------------------------------------

Addendum: 06/30/18 at 1319 by VIVIANA BRIONES RN

-------------------------------------------------------------------------------

PT IS RELUCTANT TO DISCUSS HIS ETOH USE AS HE IS AGITATED.

## 2018-07-01 VITALS — DIASTOLIC BLOOD PRESSURE: 92 MMHG | SYSTOLIC BLOOD PRESSURE: 142 MMHG

## 2018-07-01 VITALS — DIASTOLIC BLOOD PRESSURE: 60 MMHG | SYSTOLIC BLOOD PRESSURE: 103 MMHG

## 2018-07-01 VITALS — DIASTOLIC BLOOD PRESSURE: 77 MMHG | SYSTOLIC BLOOD PRESSURE: 131 MMHG

## 2018-07-01 VITALS — SYSTOLIC BLOOD PRESSURE: 133 MMHG | DIASTOLIC BLOOD PRESSURE: 80 MMHG

## 2018-07-01 VITALS — SYSTOLIC BLOOD PRESSURE: 114 MMHG | DIASTOLIC BLOOD PRESSURE: 63 MMHG

## 2018-07-01 LAB
ALP SERPL-CCNC: 67 U/L (ref 50–136)
ALT SERPL W/O P-5'-P-CCNC: 30 U/L (ref 16–63)
AMYLASE SERPL-CCNC: 23 U/L (ref 25–115)
AST SERPL-CCNC: 34 U/L (ref 15–37)
BASOPHILS # BLD AUTO: 0 K/UL (ref 0–8)
BASOPHILS NFR BLD AUTO: 0.4 % (ref 0–2)
BILIRUB SERPL-MCNC: 0.5 MG/DL (ref 0.2–1)
BUN SERPL-MCNC: 19 MG/DL (ref 7–18)
CHLORIDE SERPL-SCNC: 104 MMOL/L (ref 98–107)
CO2 SERPL-SCNC: 28 MMOL/L (ref 21–32)
CREAT SERPL-MCNC: 1.2 MG/DL (ref 0.6–1.3)
EOSINOPHIL # BLD AUTO: 0.5 K/UL (ref 0–0.7)
EOSINOPHIL NFR BLD AUTO: 8.5 % (ref 0–7)
ETHANOL SERPL-MCNC: < 3 MG/DL (ref 0–0)
GLUCOSE SERPL-MCNC: 90 MG/DL (ref 74–106)
HCT VFR BLD AUTO: 39.2 % (ref 36.7–47.1)
HGB BLD-MCNC: 13.4 G/DL (ref 12.5–16.3)
LIPASE SERPL-CCNC: 43 U/L (ref 73–393)
LYMPHOCYTES # BLD AUTO: 2.4 K/UL (ref 20–40)
LYMPHOCYTES NFR BLD AUTO: 37 % (ref 20.5–51.5)
MAGNESIUM SERPL-MCNC: 2 MG/DL (ref 1.8–2.4)
MCH RBC QN AUTO: 28 UUG (ref 23.8–33.4)
MCHC RBC AUTO-ENTMCNC: 34 G/DL (ref 32.5–36.3)
MCV RBC AUTO: 81.6 FL (ref 73–96.2)
MONOCYTES # BLD AUTO: 0.6 K/UL (ref 2–10)
MONOCYTES NFR BLD AUTO: 8.6 % (ref 0–11)
NEUTROPHILS # BLD AUTO: 2.9 K/UL (ref 1.8–8.9)
NEUTROPHILS NFR BLD AUTO: 45.5 % (ref 38.5–71.5)
PLATELET # BLD AUTO: 186 K/UL (ref 152–348)
POTASSIUM SERPL-SCNC: 3.9 MMOL/L (ref 3.5–5.1)
RBC # BLD AUTO: 4.81 MIL/UL (ref 4.06–5.63)
TSH SERPL DL<=0.005 MIU/L-ACNC: 1.71 MIU/ML (ref 0.36–3.74)
WBC # BLD AUTO: 6.4 K/UL (ref 3.6–10.2)
WS STN SPEC: 7 G/DL (ref 6.4–8.2)

## 2018-07-01 RX ADMIN — QUETIAPINE SCH MG: 200 TABLET, FILM COATED ORAL at 21:51

## 2018-07-01 RX ADMIN — DIAZEPAM SCH MG: 10 TABLET ORAL at 09:49

## 2018-07-01 RX ADMIN — LORAZEPAM PRN MG: 1 TABLET ORAL at 16:36

## 2018-07-01 RX ADMIN — ARIPIPRAZOLE SCH MG: 5 TABLET ORAL at 21:51

## 2018-07-01 RX ADMIN — BUPRENORPHINE HYDROCHLORIDE SCH MG: 2 TABLET SUBLINGUAL at 09:00

## 2018-07-01 RX ADMIN — FOLIC ACID SCH MG: 1 TABLET ORAL at 09:49

## 2018-07-01 RX ADMIN — DIAZEPAM SCH MG: 10 TABLET ORAL at 21:51

## 2018-07-01 RX ADMIN — GABAPENTIN SCH MG: 300 CAPSULE ORAL at 16:55

## 2018-07-01 RX ADMIN — BUPRENORPHINE HYDROCHLORIDE SCH MG: 2 TABLET SUBLINGUAL at 09:49

## 2018-07-01 RX ADMIN — DIAZEPAM SCH MG: 10 TABLET ORAL at 15:49

## 2018-07-01 RX ADMIN — DIAZEPAM SCH MG: 10 TABLET ORAL at 12:33

## 2018-07-01 RX ADMIN — BUPRENORPHINE HYDROCHLORIDE SCH MG: 2 TABLET SUBLINGUAL at 17:54

## 2018-07-01 RX ADMIN — Medication SCH MG: at 09:49

## 2018-07-01 RX ADMIN — THERA TABS SCH UDTAB: TAB at 09:49

## 2018-07-01 RX ADMIN — BUPRENORPHINE HYDROCHLORIDE SCH MG: 2 TABLET SUBLINGUAL at 14:25

## 2018-07-01 RX ADMIN — CLONIDINE HYDROCHLORIDE PRN MG: 0.1 TABLET ORAL at 15:50

## 2018-07-01 RX ADMIN — BUPRENORPHINE HYDROCHLORIDE SCH MG: 2 TABLET SUBLINGUAL at 21:51

## 2018-07-01 NOTE — NUR
Px woke up

Px woke up. Px appears drowsy, unshaven, disheveled, and odorous. Drinks and snacks all over 
the the bed side table and on top of the cabinet. Wash clothes noted on the floor. No 
complaints made at this time. Due medications given. We'll continue to monitor.

## 2018-07-01 NOTE — NUR
END OF SHIFT NOTE



Endorsed pt to oncoming nurse, pt is in his room. Pt is a 22 y/o male A/O to person, place, 
time, and purpose. Pt was admitted for medically supervised withdrawal from ETOH and 
Benzodiazepines. Pt was intoxicated upon admission. Pt presents w/ anxiety, lethargy, 
abdominal cramping, nausea, fine tremors, sweats, and chills. Pt was less agitated and more 
cooperative than during the day on 6/30/18, but began to show s/s of withdrawal. Pt denies 
S/I and H/I. Pt rcvd no PRN medications during shift. Pts fluid intake was 1091ml and he 
voided 2 times. Pt slept for 9.5 hrs. Pts last CIWA 8 and COWS 8 @ 2200. Call light is 
within reach.

## 2018-07-01 NOTE — NUR
START OF SHIFT:

PATIENT IS A 23 YR OLD MALE WHO WAS ADMITTED TO [facility] ON 6/30/18 FOR A MEDICALLY 
SUPERVISED WITHDRAWAL FROM ALCOHOL,  BENZODIAZEPINES ( KLONOPIN), OPIATES ( HEROIN) AND 
METHAMPHETAMINES. HE IS ON A VALIUM / SUBUTEX TAPER AND THIS IS DAY 1. NO PRN MEDICATIONS 
WERE REQUIRED OR REQUESTED ON PM SHIFT. LAST COWS 7 AND CIWA 8 @ 10PM. HE SLEPT FOR 9.5 
HOURS. PATIENT IS ASLEEP IN BED AT THIS TIME, BREATHING EVEN AND UNLABORED, SIDE RAILS UP 
X2, CALL LIGHT WITHIN REACH. CONTINUE TO FOLLOW MD PLAN OF CARE AND OFFER SUPPORT AS NEEDED.

## 2018-07-01 NOTE — NUR
CIWA AND COWS DEFERRED



Pt is in bed w/ his eyes closed. Pt's respirations are even and unlabored.

## 2018-07-01 NOTE — NUR
CIWA AND COWS DEFERRED



Pt is in bed w/ his eyes closed. Pt's respirations are unlabored and even.

## 2018-07-01 NOTE — NUR
END OF SHIFT:   

PATIENT IS A 23 YR OLD MALE WHO WAS ADMITTED TO [facility] ON 6/30/18 FOR A MEDICALLY SUPERVISED 
WITHDRAWAL FROM BENZODIAZEPINES ( KLONOPIN) AND OPIATES ( HEROIN ). HE HAS BEEN PLACED ON A 
5 DAY VALIUM/SUBUTEX TAPER WHICH STARTED TODAY AND IS TOLERATING WELL. WITHDRAWAL  SYMPTOMS 
INCLUDE: DECREASED APPETITE, BILATERAL HAND TREMORS, FATIGUE, GENERALIZED BODY ACHES AND 
CONSTIPATION. PRN MEDS GIVEN ON THIS SHIFT : MOM / WITH POSITIVE EFFECTS, CLONIDINE AND 
ATIVAN 2MG PO. HE HAD A FLUID INTAKE OF 1750ML, 5 VOIDS AND 1BM. LAST COWS 11@ 1600 AND CIWA 
13@ 1700. CONTINUE TO FOLLOW MD PLAN OF CARE  AND OFFER HELP AS NEEDED. ENDORSED TO NIGHT 
SHIFT.

## 2018-07-01 NOTE — NUR
PRN REASSESS

PATIENT STATES MOM IS WORKING AND HE IS ABLE TO PASS SMALL AMOUNTS OF STOOL, WILL CONT TO 
MONITOR

## 2018-07-01 NOTE — NUR
SUBUTEX HELD

PATIENT STATES HE IS NOT READY TO START SUBUTEX TAPER, COWS 12 BUT HE STATES HE WILL LET 
NURSE KNOW WHEN HE IS READY.

## 2018-07-01 NOTE — NUR
Start of Shift Note

Received 22 y/o male px, admitted for medically supervised withdrawal from benzo and opiate. 
Px was placed on 5 day Valium and 5 day Subutex taper, started today. Px is tolerating them. 
Last reported COWS 11 and CIWA 13 by AM shift nurse. During the rounds at 2000, px is asleep 
on bed in fowlers position. Bed on lowest position, side rails up padded and call light 
within reach. Well continue to monitor.

## 2018-07-01 NOTE — NUR
COWS and CIWA deferred

COWS and CIWA deferred due to the px is asleep, to assess if the px is awake per doctor's 
order. We'll continue to monitor.

## 2018-07-02 VITALS — DIASTOLIC BLOOD PRESSURE: 76 MMHG | SYSTOLIC BLOOD PRESSURE: 130 MMHG

## 2018-07-02 VITALS — DIASTOLIC BLOOD PRESSURE: 68 MMHG | SYSTOLIC BLOOD PRESSURE: 121 MMHG

## 2018-07-02 VITALS — DIASTOLIC BLOOD PRESSURE: 61 MMHG | SYSTOLIC BLOOD PRESSURE: 107 MMHG

## 2018-07-02 VITALS — DIASTOLIC BLOOD PRESSURE: 60 MMHG | SYSTOLIC BLOOD PRESSURE: 141 MMHG

## 2018-07-02 VITALS — SYSTOLIC BLOOD PRESSURE: 111 MMHG | DIASTOLIC BLOOD PRESSURE: 66 MMHG

## 2018-07-02 VITALS — SYSTOLIC BLOOD PRESSURE: 118 MMHG | DIASTOLIC BLOOD PRESSURE: 73 MMHG

## 2018-07-02 RX ADMIN — Medication SCH MG: at 08:47

## 2018-07-02 RX ADMIN — QUETIAPINE SCH MG: 200 TABLET, FILM COATED ORAL at 21:22

## 2018-07-02 RX ADMIN — BUPRENORPHINE HYDROCHLORIDE SCH MG: 2 TABLET SUBLINGUAL at 08:47

## 2018-07-02 RX ADMIN — IBUPROFEN PRN MG: 600 TABLET, FILM COATED ORAL at 10:11

## 2018-07-02 RX ADMIN — LORAZEPAM PRN MG: 1 TABLET ORAL at 04:33

## 2018-07-02 RX ADMIN — DIAZEPAM SCH MG: 10 TABLET ORAL at 21:23

## 2018-07-02 RX ADMIN — DIAZEPAM SCH MG: 10 TABLET ORAL at 15:26

## 2018-07-02 RX ADMIN — ARIPIPRAZOLE SCH MG: 5 TABLET ORAL at 21:23

## 2018-07-02 RX ADMIN — CLONIDINE HYDROCHLORIDE PRN MG: 0.1 TABLET ORAL at 16:18

## 2018-07-02 RX ADMIN — THERA TABS SCH UDTAB: TAB at 08:47

## 2018-07-02 RX ADMIN — GABAPENTIN SCH MG: 300 CAPSULE ORAL at 13:00

## 2018-07-02 RX ADMIN — BUPRENORPHINE HYDROCHLORIDE SCH MG: 2 TABLET SUBLINGUAL at 21:29

## 2018-07-02 RX ADMIN — DIAZEPAM SCH MG: 10 TABLET ORAL at 08:47

## 2018-07-02 RX ADMIN — FOLIC ACID SCH MG: 1 TABLET ORAL at 08:47

## 2018-07-02 RX ADMIN — HYDROXYZINE PAMOATE PRN MG: 25 CAPSULE ORAL at 16:16

## 2018-07-02 RX ADMIN — GABAPENTIN SCH MG: 300 CAPSULE ORAL at 17:38

## 2018-07-02 RX ADMIN — GABAPENTIN SCH MG: 300 CAPSULE ORAL at 08:47

## 2018-07-02 RX ADMIN — BUPRENORPHINE HYDROCHLORIDE SCH MG: 2 TABLET SUBLINGUAL at 15:27

## 2018-07-02 NOTE — NUR
MEDICATION HELD

Neurontin 600mg PO scheduled at 1300 was held due to pt was too sedated. Safety precautions 
observed. Will continue to monitor.

## 2018-07-02 NOTE — NUR
PRN GIVEN

Pt is c/o right hand pain 7/10. Motrin 600mg PO PRN was given as ordered. encouraged 
increase fluid intake. Will continue to monitor.

## 2018-07-02 NOTE — NUR
[facility] AND COWS DEFERRED

PATIENT SLEEPING. RESPIRATION EVEN AND UNLABORED. WILL ASSESS PATIENT WHEN HE WAKES UP.

## 2018-07-02 NOTE — NUR
PRN GIVEN

Pt c/o increase anxiety. Clonidine 0.1mg PO and Vistaril 25mg PO PRN was given as ordered. 
Will continue to monitor.

## 2018-07-02 NOTE — NUR
COWS AND CIWA ASSESSMENT DEFERRED

Pt is currently in bed sleeping with respirations even and unlabored. COWS and CIWA score is 
deferred at this time. Will continue to monitor.

## 2018-07-02 NOTE — NUR
END OF SHIFT

Pt is a 23 yr old male, AA&Ox4. Pt was admitted on 6/30/18 for Benzo/Opiate withdrawal and 
is on 5 day Valium and 5 day Subutex taper as ordered. Medication andres well. Pt has been 
noted with increase fatigue and remained in his room throughout the day. Pt was c/o increase 
anxiety, muscle aches, and right hand pain. Pt received Motrin 600mg PO PRN at 1011 and 
Clonidine 0.1mg PO PRN and Vistaril 25mg PO PRN at 1618. Medication was effective. Last COWS 
9 and CIWA score was 12 at 1600.  Pt was encouraged increase fluid intake for hydration. 
Safety precautions observed. Call light is within reach. Endorsed to night shift nurse to 
continue with care.

## 2018-07-02 NOTE — NUR
PRN Ativan

Px received Ativan 1 mg/tab, 2 tabs PO as PRN medication for agitation and anxiety. CIWA 12 
at this moment. We'll continue to monitor.

## 2018-07-02 NOTE — NUR
End of Shift Note

During the shift at 0433, Px received Ativan 2 mg PO as PRN medication for agitation and 
increase anxiety. Px also complained of pain on the right hand due to IV track marks. Ice 
pack and warm pack applied. Pxs oral intake is 1000 ml, voided 2x, without BM. Px slept for 
8.5 hours. Last COWS 9 and CIWA 12. At 0630, px is awake on bed in fowlers position. Bed on 
lowest position, side rails up padded and call light within reach. Well continue to 
monitor. Px is endorsed to AM shift nurse.

## 2018-07-02 NOTE — NUR
START OF SHIFT

Pt is a 23 yr old male, AA%Ox4. pt was admitted on 6/30/18 for Benzo/Opiate withdrawal and 
is on 5 day Subutex taper and 5 day Valium taper as ordered. Received report from night 
shift nurse. Pt received Ativan 2mg PO PRN during the night. Medication was effective. pt 
slept for 8.5 hrs. Last COWS score was 9 and CIWA score was 12 during the night. Pt is c/o 
increase anxiety and pain of right hand. Pt is noted with slurred speech and difficulty 
thinking clearly.  Pt's room is noted with multiple open bottles of juice and water on his 
bed side table. Pt is on fall and seizure precautions. Call light is within reach. Will 
continue to monitor.

## 2018-07-02 NOTE — NUR
PRN RE-ASSESSMENT

Motrin PRN was effective. Pain level subsided to 2/10. Encouraged increase fluid intake. 
Will continue to monitor.

## 2018-07-02 NOTE — NUR
Px woke up 

Px woke up to eat some snacks. Px stated "Can I have Ativan? I am too anxious and I want to 
go back to sleep."

## 2018-07-02 NOTE — NUR
RECEIVED REPORT FROM DAY SHIFT NURSE. PATIENT IS A 23 YEAR OLD MALE ADMITTED FOR  
BENZO/OPIATE WITHDRAWAL. PATIENT  IS ON 5 DAY SUBUTEX AND 5 DAY VALIUM TAPER. PATIENT WAS 
C/O RIGHT HAND PAIN , X-RAY DONE AND WITH NEGATIVE OF FRACTURE.  PATIENT WAS GIVEN MOTRIN, 
CLONIDINE AND VISTARIL. LAST COWS 9 AND CIWA 12. PATIENT IN THE ROOM, SLEEPING.  RESPIRATION 
EVEN AND UNLABORED. RESPIRATION EVEN AND UNLABORED. GARBAGE NOTED AROUND ROOM, SPILLED 
DRINKS , DIRTY SHEETS  AND CLOTHES THROWN ON THE FLOOR.   SAFETY MEASURES IN PLACE. CALL 
LIGHT IN REACH. WILL CONTINUE TO MONITOR

## 2018-07-02 NOTE — NUR
PRN RE-ASSESSMENT

Clonidine 0.1mg PO PRN and Vistaril PRN was effective. Pt continues to c/o anxiety but is 
able to cope with anxiety level. Will continue to monitor.

## 2018-07-03 VITALS — DIASTOLIC BLOOD PRESSURE: 61 MMHG | SYSTOLIC BLOOD PRESSURE: 103 MMHG

## 2018-07-03 VITALS — SYSTOLIC BLOOD PRESSURE: 122 MMHG | DIASTOLIC BLOOD PRESSURE: 70 MMHG

## 2018-07-03 VITALS — SYSTOLIC BLOOD PRESSURE: 109 MMHG | DIASTOLIC BLOOD PRESSURE: 76 MMHG

## 2018-07-03 VITALS — SYSTOLIC BLOOD PRESSURE: 120 MMHG | DIASTOLIC BLOOD PRESSURE: 69 MMHG

## 2018-07-03 VITALS — DIASTOLIC BLOOD PRESSURE: 65 MMHG | SYSTOLIC BLOOD PRESSURE: 105 MMHG

## 2018-07-03 RX ADMIN — DIAZEPAM SCH MG: 5 TABLET ORAL at 20:50

## 2018-07-03 RX ADMIN — IBUPROFEN PRN MG: 600 TABLET, FILM COATED ORAL at 10:01

## 2018-07-03 RX ADMIN — DIAZEPAM SCH MG: 5 TABLET ORAL at 17:00

## 2018-07-03 RX ADMIN — CLONIDINE HYDROCHLORIDE PRN MG: 0.1 TABLET ORAL at 09:02

## 2018-07-03 RX ADMIN — FOLIC ACID SCH MG: 1 TABLET ORAL at 09:02

## 2018-07-03 RX ADMIN — DIAZEPAM SCH MG: 5 TABLET ORAL at 13:41

## 2018-07-03 RX ADMIN — GABAPENTIN SCH MG: 300 CAPSULE ORAL at 17:00

## 2018-07-03 RX ADMIN — THERA TABS SCH UDTAB: TAB at 09:02

## 2018-07-03 RX ADMIN — ARIPIPRAZOLE SCH MG: 5 TABLET ORAL at 20:49

## 2018-07-03 RX ADMIN — Medication SCH MG: at 09:02

## 2018-07-03 RX ADMIN — BUPRENORPHINE HYDROCHLORIDE SCH MG: 2 TABLET SUBLINGUAL at 15:00

## 2018-07-03 RX ADMIN — BUPRENORPHINE HYDROCHLORIDE SCH MG: 2 TABLET SUBLINGUAL at 20:50

## 2018-07-03 RX ADMIN — GABAPENTIN SCH MG: 300 CAPSULE ORAL at 13:41

## 2018-07-03 RX ADMIN — QUETIAPINE SCH MG: 200 TABLET, FILM COATED ORAL at 20:50

## 2018-07-03 RX ADMIN — GABAPENTIN SCH MG: 300 CAPSULE ORAL at 09:02

## 2018-07-03 RX ADMIN — DIAZEPAM SCH MG: 5 TABLET ORAL at 09:02

## 2018-07-03 NOTE — NUR
Pt states the Clonidine,Robaxin and Motrin was effective. h/a now 2/10 on pain scale.Chills 
,sweats and body aches reduced.

## 2018-07-03 NOTE — NUR
1500 Subutex held as Pt. is asleep. 1600 COWS and CIWA deferred . Respirations even and 
unlabored. bed locked and low.Call bell in reach. Will continue to monitor and offer 
support.

## 2018-07-03 NOTE — NUR
End Of Shift:

Pt continues on Valium/Subutex taper to manage s/s of w/d which include 
anxiety,sweats,chills, restlessness and fatigue.Pt is easily agitated and has an irritable 
mood. last COWS 9 and CIWA 11 at noon. PRN Motrin,Robaxin and Clonidine given and effective 
to assist in managing symptoms.1500 meds held and 1700 meds held as Pt has been asleep for 
the last few hours. COWS and CIWA deferred at 1600 as well. He did not attended some groups 
and was compliant with increased fluids. [facility]

## 2018-07-03 NOTE — NUR
1700 meds held as Pt is sleeping. Bed locked and low. Call bell in reach. Will continue to 
monitor and offer support.

## 2018-07-03 NOTE — NUR
START OF SHIFT:

RECEIVED PT A/O X 4. HE PRESENTS WITH ANXIOUS MOOD AND IS SLIGHTLY AGITATED. HE IS 
DISHEVELED. HIS ROOM IS IN DISARRAY. HE REPORTS RESTLESS SLEEP LAST NIGHT. HE REPORTS 
SWEATS,BODY ACHES,ANXIETY,RESTLESSNESS,IRRITABILITY,FATIGUE,H/A 5/10 AND INTERMITTENT 
CHILLS. COWS 10 CIWA 13.SUBUTEX/VALIUM TAPER IN PROGRESS. PRN CLONIDINE,ROBAXIN AND MOTRIN 
GIVEN TO MANAGE S/S OF W/D. ENCOURAGED INCREASED FLUIDS FOR HYDRATION. ENCOURAGED GROUP 
ATTENDANXCE TO IMPROVE COPING SKILLS  AND PREVENT RELAPSE. WILL CONTINUE TO MONITOR.

## 2018-07-03 NOTE — NUR
COWS AND CIWA DEFERRED 

PATIENT  SLEEPING. RESPIRATION EVEN AND UNLABORED. WILL CONTINUE TO MONITOR

## 2018-07-03 NOTE — NUR
END OF SHIFT NOTE 

PATIENT SLEPT 7 HOURS. FLUID INTAKE 1,141  ML. VOIDED X 3 . BM X 1. MONITORED PATIENT  
THROUGHOUT SHIFT. PATIENT IN BED , SLEEPING BEGINNING OF SHIFT. AT  2123, PATIENT WOKE UP , 
SCHEDULED MEDICATION AND TAPER GIVEN AS ORDERED.  PATIENT WENT BACK TO SLEEP.  PATIENT WENT 
DOWN TO SMOKE TWICE DURING SHIFT.   PATIENT DID NOT REQUIRE PRN MEDICATION. SAFETY MEASURES 
IN PLACE. CALL LIGHT IN REACH. WILL CONTINUE TO MONITOR . LAST  COWS 10 AND CIWA  10.

## 2018-07-03 NOTE — NUR
START OF SHIFT



Received 23 year old male patient admitted on 6/30/18 for Klonopin, Heroin, and 
Methamphetamine withdrawal. Pt is alert and oriented x4. He is noted with anxiety, 
restlessness, and agitation. He is noted with flat affect and withdrawn and suspicious 
behavior. He complains of sweats/chills, body aches, lightheadedness, and stomach cramps. He 
is currently receiving a 5 day Subutex and 5 day Valium taper and tolerating well. Per 
endorsement, pt's 1700 medications were held d/t pt was asleep.  Breathing is even and 
unlabored, safety measures in place. Will monitor.

## 2018-07-04 VITALS — SYSTOLIC BLOOD PRESSURE: 124 MMHG | DIASTOLIC BLOOD PRESSURE: 77 MMHG

## 2018-07-04 VITALS — SYSTOLIC BLOOD PRESSURE: 112 MMHG | DIASTOLIC BLOOD PRESSURE: 68 MMHG

## 2018-07-04 VITALS — SYSTOLIC BLOOD PRESSURE: 111 MMHG | DIASTOLIC BLOOD PRESSURE: 57 MMHG

## 2018-07-04 VITALS — SYSTOLIC BLOOD PRESSURE: 125 MMHG | DIASTOLIC BLOOD PRESSURE: 74 MMHG

## 2018-07-04 VITALS — DIASTOLIC BLOOD PRESSURE: 69 MMHG | SYSTOLIC BLOOD PRESSURE: 117 MMHG

## 2018-07-04 PROCEDURE — HZ41ZZZ GROUP COUNSELING FOR SUBSTANCE ABUSE TREATMENT, BEHAVIORAL: ICD-10-PCS

## 2018-07-04 RX ADMIN — BUPRENORPHINE HYDROCHLORIDE SCH MG: 2 TABLET SUBLINGUAL at 08:56

## 2018-07-04 RX ADMIN — IBUPROFEN PRN MG: 600 TABLET, FILM COATED ORAL at 09:12

## 2018-07-04 RX ADMIN — FOLIC ACID SCH MG: 1 TABLET ORAL at 08:56

## 2018-07-04 RX ADMIN — GABAPENTIN SCH MG: 300 CAPSULE ORAL at 12:02

## 2018-07-04 RX ADMIN — SULFAMETHOXAZOLE AND TRIMETHOPRIM SCH TAB: 800; 160 TABLET ORAL at 08:56

## 2018-07-04 RX ADMIN — BUPRENORPHINE HYDROCHLORIDE SCH MG: 2 TABLET SUBLINGUAL at 21:02

## 2018-07-04 RX ADMIN — Medication SCH MG: at 12:03

## 2018-07-04 RX ADMIN — DIAZEPAM SCH MG: 5 TABLET ORAL at 21:02

## 2018-07-04 RX ADMIN — DIAZEPAM SCH MG: 5 TABLET ORAL at 15:13

## 2018-07-04 RX ADMIN — DIAZEPAM SCH MG: 5 TABLET ORAL at 08:56

## 2018-07-04 RX ADMIN — SULFAMETHOXAZOLE AND TRIMETHOPRIM SCH TAB: 800; 160 TABLET ORAL at 21:02

## 2018-07-04 RX ADMIN — THERA TABS SCH UDTAB: TAB at 08:55

## 2018-07-04 RX ADMIN — GABAPENTIN SCH MG: 300 CAPSULE ORAL at 08:56

## 2018-07-04 RX ADMIN — QUETIAPINE SCH MG: 200 TABLET, FILM COATED ORAL at 21:02

## 2018-07-04 RX ADMIN — ARIPIPRAZOLE SCH MG: 5 TABLET ORAL at 21:02

## 2018-07-04 RX ADMIN — Medication SCH MG: at 08:56

## 2018-07-04 RX ADMIN — BUPRENORPHINE HYDROCHLORIDE SCH MG: 2 TABLET SUBLINGUAL at 15:13

## 2018-07-04 RX ADMIN — GABAPENTIN SCH MG: 300 CAPSULE ORAL at 17:30

## 2018-07-04 NOTE — NUR
VITALS REFUSED, COWS/CIWA DEFERRED



0400 vitals refused. COWS and CIWA deferred d/t pt lying in bed with eyes closed noted to be 
asleep. Breathing is even and unlabored, safety measures in place. Will monitor.

## 2018-07-04 NOTE — NUR
PRN Re-Assessment

Medication effective. Pt. reports right wrists pain 2/10. Will continue to monitor pt.'s 
behavior for safety.

## 2018-07-04 NOTE — NUR
MD COMMUNICATION



Pt complained of right wrist pain related to IV drug use. Area is warm to touch. Pt was seen 
and examined by Dr. Aldridge with new order for Bactrim DS PO BID x1 week. Order noted and 
carried out.

## 2018-07-04 NOTE — NUR
Start of shift note

Received report from day shift nurse.  Pt is a 22 yo male, A+Ox4, presenting to [facility] for Benzo/Opiate/Meth withdrawal.  Pt noted with anxiety, agitation, and 
restlessness.  Pt has HX of Bipolar and depression which will be monitored during shift.  Pt 
is on 5 day Subutex and 5 day Valium tapers, tolerated well.  Respirations even and 
unlabored.  Will continue to monitor.

## 2018-07-04 NOTE — NUR
PRN Medication 

During morning medication pass pt. complained of 7/10 right wrists pain. Gave pt. motrin 600 
mg at this time. Will continue to monitor pt.s behavior for medication effectiveness and 
safety.

## 2018-07-04 NOTE — NUR
End Of Shift Note

Pt is a 23 year old male patient admitted on 6/30/18 for the medically managed withdrawal 
from Benzodiazepines, opiates and Methamphetamine. Pt. placed on a 5 day subutex and valium 
tapers. Pt. remained a/o x 4 throughout shift. He was continually noted with anxiety, 
restlessness, and agitation. Pt. continued to complain of  right wrist pain d/t IV drug use, 
site was noted to be warm to touch. MD aware and ordered Bactrim DS PO BID x 1 week. PRN 
Motrin given for right wrists pain. Pt. able to be redirected at this time. Pt. compliant 
with treatment plan and medication regiment. Last COWs 11 and CIWA 13 at 1600. Safety 
measures in pain. Will endorse pt.s behavior to oncoming shift.

## 2018-07-04 NOTE — NUR
END OF SHIFT



Pt is a 23 year old male patient admitted on 6/30/18 for Klonopin, Heroin, and 
Methamphetamine withdrawal. Pt remains alert and oriented x4. He was noted with anxiety, 
restlessness, and agitation. He complained of sweats/chills, body aches, lightheadedness, 
runny nose, watery eyes, and stomach cramps during the shift. He continues on a 5 day 
Subutex and 5 day Valium taper and is tolerating well. He had complaints of right wrist pain 
d/t IV drug use, site was noted to be warm to touch. MD with new order for Bactrim DS PO BID 
x 1 week. He slept a total of 6 hrs, Intake: 596 mL,Void:x1, BM: 0, COWS:10, CIWA:12 at 
2000. Breathing is even and unlabored, safety measures in place. Will endorse.

## 2018-07-04 NOTE — NUR
Start of Shift Note

Pt is a 23 year old male patient admitted on 6/30/18 for Klonopin, Heroin,

and Methamphetamine withdrawal. Pt remains alert and oriented x4. He was

noted with anxiety, restlessness, and agitation. He complained of

sweats/chills, body aches, lightheadedness, runny nose, watery eyes, and

stomach cramps during the shift. He continues on a 5 day Subutex and 5 day

Valium taper and is tolerating well. He had complaints of right wrist pain

d/t IV drug use, site was noted to be warm to touch. MD with new order for

Bactrim DS PO BID x 1 week. He slept a total of 6 hrs, Intake: 596

mL,Void:x1, BM: 0, COWS:10, CIWA:12 at 2000. Breathing is even and

unlabored, safety measures in place. Will endorse.

## 2018-07-04 NOTE — NUR
COWS/CIWA DEFERRED



0000 COWS and CIWA Deferred d/t pt is lying in bed with eyes closed noted to be asleep. 
Breathing even and unlabored, safety measures in place. Will continue to monitor.

## 2018-07-05 VITALS — SYSTOLIC BLOOD PRESSURE: 111 MMHG | DIASTOLIC BLOOD PRESSURE: 69 MMHG

## 2018-07-05 VITALS — DIASTOLIC BLOOD PRESSURE: 53 MMHG | SYSTOLIC BLOOD PRESSURE: 100 MMHG

## 2018-07-05 VITALS — DIASTOLIC BLOOD PRESSURE: 76 MMHG | SYSTOLIC BLOOD PRESSURE: 126 MMHG

## 2018-07-05 VITALS — SYSTOLIC BLOOD PRESSURE: 113 MMHG | DIASTOLIC BLOOD PRESSURE: 69 MMHG

## 2018-07-05 VITALS — DIASTOLIC BLOOD PRESSURE: 73 MMHG | SYSTOLIC BLOOD PRESSURE: 122 MMHG

## 2018-07-05 VITALS — SYSTOLIC BLOOD PRESSURE: 125 MMHG | DIASTOLIC BLOOD PRESSURE: 78 MMHG

## 2018-07-05 RX ADMIN — DIAZEPAM SCH MG: 5 TABLET ORAL at 20:06

## 2018-07-05 RX ADMIN — Medication SCH MG: at 08:42

## 2018-07-05 RX ADMIN — SULFAMETHOXAZOLE AND TRIMETHOPRIM SCH TAB: 800; 160 TABLET ORAL at 08:42

## 2018-07-05 RX ADMIN — HYDROXYZINE PAMOATE PRN MG: 25 CAPSULE ORAL at 14:19

## 2018-07-05 RX ADMIN — THERA TABS SCH UDTAB: TAB at 08:42

## 2018-07-05 RX ADMIN — DIAZEPAM SCH MG: 5 TABLET ORAL at 08:42

## 2018-07-05 RX ADMIN — SULFAMETHOXAZOLE AND TRIMETHOPRIM SCH TAB: 800; 160 TABLET ORAL at 20:06

## 2018-07-05 RX ADMIN — QUETIAPINE SCH MG: 200 TABLET, FILM COATED ORAL at 20:06

## 2018-07-05 RX ADMIN — GABAPENTIN SCH MG: 300 CAPSULE ORAL at 16:31

## 2018-07-05 RX ADMIN — ARIPIPRAZOLE SCH MG: 5 TABLET ORAL at 20:06

## 2018-07-05 RX ADMIN — GABAPENTIN SCH MG: 300 CAPSULE ORAL at 08:42

## 2018-07-05 RX ADMIN — CLONIDINE HYDROCHLORIDE PRN MG: 0.1 TABLET ORAL at 14:19

## 2018-07-05 RX ADMIN — IBUPROFEN PRN MG: 600 TABLET, FILM COATED ORAL at 12:05

## 2018-07-05 RX ADMIN — FOLIC ACID SCH MG: 1 TABLET ORAL at 08:42

## 2018-07-05 RX ADMIN — GABAPENTIN SCH MG: 300 CAPSULE ORAL at 12:13

## 2018-07-05 NOTE — NUR
PRN Re-Assessment 



Pt. states "my wrists still hurts but not as much anymore maybe a 4/10. " MD aware of pt.'s 
right wrists pain and pt. is currently taking P.O. antibiotics for possible infection. 
Medication effective. Will continue to monitor pt.'s behavior for safety.

## 2018-07-05 NOTE — NUR
Start of Shift.

Pt. is a 22 y/o male admitted for the medically managed withdrawal from Benzodiazepines, 
opiates, and methamphetamine salts. Pt. is on his last day of his 5 day subutex and valium 
tapers. Endorse pt.s behavior from previous shift was continuously anxious, restless and 
agitated. Received pt. in room. Pt. in laying bed with eyes closed. Pt. arousable to name 
and touch. Pt. appears disheveled with dark rings around his eyes. Educated pt. on treatment 
plan and medication regiment. Pt slept for a total of 8 HRS.  Last COWS: 8 and Last CIWA: 10 
@0400. Respirations even and unlabored.  Will continue to monitor pt.s behavior for safety.

## 2018-07-05 NOTE — NUR
Start of shift note

Received report from day shift nurse.  Pt is a 22 yo male, A+Ox4, presenting to [facility] for Benzo/Opiate/Meth withdrawal.  Pt noted with agitation, anxiety, and 
restlessness.  Pt has HX of bipolar disorder and depression which will be monitored during 
shift.  Pt is on 5 day Subutex and 5 day Valium tapers, tolerated well.  Respirations even 
and unlabored.  Will continue to monitor.

## 2018-07-05 NOTE — NUR
End of shift note

Pt was continuously noted with agitation, restlessness, and anxiety.  Pt remained in room 
for majority of shift except to go smoke on smoking patio and to get food from kitchen.  Pt 
remained cooperative and compliant with all aspects of treatment.  Pt was not given any PRN 
medications during shift.  Pt is on 5 day Subutex and 5 day Valium tapers, tolerated well.  
Pt slept for a total of 8 HRS.  Last COWS: 8 and Last CIWA: 10 @0400.  Respirations even and 
unlabored.  Will endorse to day shift nurse.

## 2018-07-05 NOTE — NUR
PRN Re-Assessment

Pt. in bed with eyes closed. Breathing even and regular. No signs of acute distress noted. 
Medication effective. Will continue to monitor pt.'s behavior for safety.

## 2018-07-05 NOTE — NUR
PRN Medication 

Pt. complain of 8/10 right wrists pain. Pt. given Motrin at this time. Will continue to 
monitor pt.'s behavior for safety and medication effectiveness.

## 2018-07-05 NOTE — NUR
PRN Medication

Pt. in room complaining of anxiety. Pt. presents as restless with a depressed affect. At 
this time gave pt. clonidine and vistaril. Will continue to monitor pt.'s behavior for 
safety, and medication effectiveness.

## 2018-07-06 VITALS — SYSTOLIC BLOOD PRESSURE: 128 MMHG | DIASTOLIC BLOOD PRESSURE: 84 MMHG

## 2018-07-06 VITALS — DIASTOLIC BLOOD PRESSURE: 50 MMHG | SYSTOLIC BLOOD PRESSURE: 89 MMHG

## 2018-07-06 VITALS — DIASTOLIC BLOOD PRESSURE: 78 MMHG | SYSTOLIC BLOOD PRESSURE: 117 MMHG

## 2018-07-06 VITALS — DIASTOLIC BLOOD PRESSURE: 74 MMHG | SYSTOLIC BLOOD PRESSURE: 115 MMHG

## 2018-07-06 VITALS — DIASTOLIC BLOOD PRESSURE: 66 MMHG | SYSTOLIC BLOOD PRESSURE: 125 MMHG

## 2018-07-06 VITALS — DIASTOLIC BLOOD PRESSURE: 86 MMHG | SYSTOLIC BLOOD PRESSURE: 130 MMHG

## 2018-07-06 RX ADMIN — Medication SCH MG: at 09:08

## 2018-07-06 RX ADMIN — QUETIAPINE SCH MG: 200 TABLET, FILM COATED ORAL at 21:38

## 2018-07-06 RX ADMIN — ARIPIPRAZOLE SCH MG: 5 TABLET ORAL at 21:38

## 2018-07-06 RX ADMIN — GABAPENTIN SCH MG: 300 CAPSULE ORAL at 09:09

## 2018-07-06 RX ADMIN — GABAPENTIN SCH MG: 300 CAPSULE ORAL at 16:37

## 2018-07-06 RX ADMIN — THERA TABS SCH UDTAB: TAB at 09:08

## 2018-07-06 RX ADMIN — GABAPENTIN SCH MG: 300 CAPSULE ORAL at 13:02

## 2018-07-06 RX ADMIN — HYDROXYZINE PAMOATE PRN MG: 25 CAPSULE ORAL at 18:13

## 2018-07-06 RX ADMIN — IBUPROFEN PRN MG: 600 TABLET, FILM COATED ORAL at 21:38

## 2018-07-06 RX ADMIN — FOLIC ACID SCH MG: 1 TABLET ORAL at 09:08

## 2018-07-06 RX ADMIN — SULFAMETHOXAZOLE AND TRIMETHOPRIM SCH TAB: 800; 160 TABLET ORAL at 21:38

## 2018-07-06 RX ADMIN — SULFAMETHOXAZOLE AND TRIMETHOPRIM SCH TAB: 800; 160 TABLET ORAL at 09:09

## 2018-07-06 RX ADMIN — Medication SCH MG: at 09:09

## 2018-07-06 RX ADMIN — HYDROXYZINE PAMOATE PRN MG: 25 CAPSULE ORAL at 10:03

## 2018-07-06 NOTE — NUR
PRN VISTARIL

Patient complained of increase anxiety, provided with non pharmacological interventions with 
no relief, administered vistaril 25mg PO as ordered, endorsed to night shift nurse to f/u on 
effectiveness of medication.

## 2018-07-06 NOTE — NUR
Reassessment of anxiety 

Px was seen at this time for reassessment of anxiety in the effectiveness of Vistaril given 
at 1813. Px stated that his anxiety is 5-6/10 and Vistaril was effective according to the 
px.

## 2018-07-06 NOTE — NUR
End of shift note

Pt was continuously noted with anxiety, agitation, and restlessness.  Pt remained in room 
for majority of shift except to get food from kitchen.  Pt remained cooperative and 
compliant with all aspects of treatment.  Pt was not given any PRN medications during shift. 
 Pt is on 5 day Subutex and 5 day Valium tapers, tolerated well.  Pt slept for a total of 9 
HRS.  Last COWS: 9 and Last CIWA: 9 @0400.  Respirations even and unlabored.  Will endorse 
to day shift nurse.

## 2018-07-06 NOTE — NUR
PRN VISTARIL

Patient complained of increase anxiety, provided with non pharmacological interventions with 
no relief, administered vistaril as ordered, will monitor effectiveness of medication.

## 2018-07-06 NOTE — NUR
PRN Ibuprofen

Px received Ibuprofen 600 mg/tab, 1 tab PO as PRN medication for his pain 8/10 as stated on 
right hand due to IV drug use. We'll continue to monitor.

## 2018-07-06 NOTE — NUR
ASSUMED CARE

assumed care for patient, patient endorsement report received from night shift nurse, all 
pertinent information was discussed. will continue to monitor closely. safety measures in 
place.

## 2018-07-06 NOTE — NUR
Start of Shift Note

Received 24 y/o male px, admitted for medically supervised withdrawal from benzo and opiate. 
Px is to be D/C tomorrow 07/07/2018. Px completed 5 day Valium and 5 day Subutex taper. Px 
tolerated them. Last reported COWS 7 and CIWA 7 by AM shift nurse. During the rounds at 
2000, px is awake on bed in fowlers position watching TV. Px appears anxious, disheveled, 
and unshaven with long and dirty finger nails. Unfinished snacks and drinks noted all over 
the bed side table and shelf. Unfolded clothes also noted on top of the cabinet. Px has good 
eye contact. Px stated "My anxiety is about 5-6/10 and I still have pain on my right hand. 
Can I have my Seroquel later around 10PM?" Bilateral hand tremors noted. Bed on lowest 
position, side rails up padded and call light within reach. Well continue to monitor.

## 2018-07-06 NOTE — NUR
VISTARIL REASSESSMENT

Medication effective, decrease in anxiety, will conitnue to monitor. safety measures in 
place.

## 2018-07-06 NOTE — NUR
Start of Shift

Writer received report on 23 year old male admitted to [facility] on 6/30/18 for medical 
management of Benzodiazepine, Opiate and Methamphetamine withdrawals. Pt endorses NKA, full 
code and regular diet. Pt denies PMH, no seizure history and endorses PPH of Bipolar DO and 
depression. Pt completed Subutex and Valium tapers last evening and is tolerating well, last 
COWS 9 and CIWA 9 at 0400. No PRN medication administered on NOC, per report. Writer 
encounters pt in pts room. Pt resting with eyes closed, even and unlabored respirations. 
Bed in low position with wheels locked and side rails up x2. Will continue to monitor, 
support and encourage according to plan of care.

## 2018-07-06 NOTE — NUR
END OF SHIFT

Patient alert and oriented x4. Assumed care for patient at 0930, patient presented with: c/o 
chills, mild bone and joint aches, tremors that can be felt but not seen, and irritable. 
Patient completed taper and is scheduled to be discharged tomorrow. Detox medication 
effective at reducing withdrawal symptoms. Patient with admitting Dx: opiate/BZO withdrawal. 
Patient with last cow score of: 5, and last ciwa score of: 7. Patient denies any SI/HI. 
During shift Patient was noted with avoidant eye contact, has sad and worried facial 
expression, flat and labile affect, disheveled hygiene, encouraged maintenance of personal 
space and hygiene.  Encouraged patient to develop coping skills and utilization of non 
pharmacological interventions. Patient received PRN Vistaril x2  during shift, endorsed to 
night shift to monitor effectiveness of dose administered at 1813, first dose medication was 
effective.  Patients safety measures are in place. call light with in reach at all times. 
patient endorsed to night shift nurse, all pertinent information discussed.

## 2018-07-06 NOTE — NUR
Endorsement of Care

Endorsed care of  23 year old male admitted to [facility] on 6/30/18 for medical management of 
Benzodiazepine, Opiate and Methamphetamine withdrawals. Pt endorses NKA, full code and 
regular diet. Pt denies PMH, no seizure history and endorses PPH of Bipolar DO and 
depression. Pt completed Subutex and Valium tapers last evening and is tolerating well, last 
COWS 7 and CIWA 7 at 0830. No PRN medication administered by this writer. Pt is calm and 
cooperative, with a flat affect and depressed mood. Linear thought process and clear speech 
pattern. A/O x4 and makes needs known. Bed in low position with wheels locked and side rails 
up x2.

## 2018-07-06 NOTE — NUR
Reassessment of Pain

Px stated that his pain on his right hand improved from 8/10 to 5/10. We'll continue to 
monitor.

## 2018-07-07 VITALS — DIASTOLIC BLOOD PRESSURE: 66 MMHG | SYSTOLIC BLOOD PRESSURE: 118 MMHG

## 2018-07-07 VITALS — SYSTOLIC BLOOD PRESSURE: 121 MMHG | DIASTOLIC BLOOD PRESSURE: 67 MMHG

## 2018-07-07 VITALS — DIASTOLIC BLOOD PRESSURE: 61 MMHG | SYSTOLIC BLOOD PRESSURE: 102 MMHG

## 2018-07-07 RX ADMIN — GABAPENTIN SCH MG: 300 CAPSULE ORAL at 08:53

## 2018-07-07 RX ADMIN — FOLIC ACID SCH MG: 1 TABLET ORAL at 08:53

## 2018-07-07 RX ADMIN — Medication SCH MG: at 08:52

## 2018-07-07 RX ADMIN — SULFAMETHOXAZOLE AND TRIMETHOPRIM SCH TAB: 800; 160 TABLET ORAL at 08:52

## 2018-07-07 RX ADMIN — THERA TABS SCH UDTAB: TAB at 08:53

## 2018-07-07 NOTE — NUR
BEGINNING OF SHIFT

Patient endorsement report received from night shift nurse, all pertinent information was 
discussed. patient with admitting dx: opiate/bzo withdrawal. Patient completed 5 day subutex 
and valium taper as ordered, and is scheduled to be discharged this morning. patients vital 
signs WNL. per night shift patient received PRN: Motrin, medication was effective. Patient 
slept for 8 hours. and last cow score of: 7, and last ciwa score of: 10. Patient received in 
bed, with eyes closed, respirations even and unlabored. will educate regarding plan of care 
for the day and medication regimen. safety measures are observed and in place. will continue 
to monitor.

## 2018-07-07 NOTE — NUR
End of Shift Note

Px is to be D/C today, 07/07/2018. During the rounds at 2138, px received Motrin 600 mg PO 
for right hand pain of 8/10 as verbalized. It was effective. Px oral intake is 1500 ml, 
voided 3x, without BM. Px slept for 7 hours. Last COWS 7 and CIWA 10. At 0630, px is still 
asleep on bed in left side lying position. Bed on lowest position, side rails up padded and 
call light within reach. Px endorsed to AM shift nurse. Well continue to monitor.

## 2018-07-07 NOTE — NUR
DISCHARGE

Patient off the unit at 1015 in stable condition, and in no apparent acute distress. Prior 
to discharge patient was provided with education and teaching regarding all discharge 
instructions with good verbal understanding. Patient noted self motivated towards sobriety. 
Patients vital signs WNL. Patient with last cow score of: 5. Patient discharged to [facility]. Patients home medications, discharge instructions and prescriptions were 
placed in patients personal duffel bag. Patient off the unit at 1015.

## 2018-10-24 ENCOUNTER — HOSPITAL ENCOUNTER (INPATIENT)
Dept: HOSPITAL 12 - SRC | Age: 23
LOS: 6 days | Discharge: HOME | DRG: 895 | End: 2018-10-30
Attending: INTERNAL MEDICINE | Admitting: INTERNAL MEDICINE
Payer: COMMERCIAL

## 2018-10-24 ENCOUNTER — HOSPITAL ENCOUNTER (INPATIENT)
Dept: HOSPITAL 12 - SRC | Age: 23
Discharge: LEFT BEFORE DISCHARGE | DRG: 894 | End: 2018-10-24
Attending: INTERNAL MEDICINE | Admitting: INTERNAL MEDICINE
Payer: COMMERCIAL

## 2018-10-24 VITALS — HEIGHT: 74 IN | WEIGHT: 183 LBS | BODY MASS INDEX: 23.49 KG/M2

## 2018-10-24 DIAGNOSIS — F31.60: ICD-10-CM

## 2018-10-24 DIAGNOSIS — F90.9: ICD-10-CM

## 2018-10-24 DIAGNOSIS — F17.210: ICD-10-CM

## 2018-10-24 DIAGNOSIS — B19.20: ICD-10-CM

## 2018-10-24 DIAGNOSIS — F19.239: Primary | ICD-10-CM

## 2018-10-24 DIAGNOSIS — Z59.0: ICD-10-CM

## 2018-10-24 DIAGNOSIS — F14.10: ICD-10-CM

## 2018-10-24 DIAGNOSIS — Z75.3: ICD-10-CM

## 2018-10-24 DIAGNOSIS — F41.1: ICD-10-CM

## 2018-10-24 DIAGNOSIS — F13.230: ICD-10-CM

## 2018-10-24 DIAGNOSIS — F11.23: Primary | ICD-10-CM

## 2018-10-24 DIAGNOSIS — E16.2: ICD-10-CM

## 2018-10-24 PROCEDURE — HZ2ZZZZ DETOXIFICATION SERVICES FOR SUBSTANCE ABUSE TREATMENT: ICD-10-PCS | Performed by: INTERNAL MEDICINE

## 2018-10-24 PROCEDURE — G0480 DRUG TEST DEF 1-7 CLASSES: HCPCS

## 2018-10-24 SDOH — ECONOMIC STABILITY - HOUSING INSECURITY: HOMELESSNESS: Z59.0

## 2018-10-24 NOTE — NUR
PRE ASSESSMENT:

Pt seen in intake A/O X 4. His affect is angry and mood congruent. he appeared preoccupied. 
He states he needs to wrap things up and needs to leave but will come back at 8pm tonight. 
He states he will not use drugs and signed an AMA form. Charge nurse made aware.

## 2018-10-25 VITALS — SYSTOLIC BLOOD PRESSURE: 113 MMHG | DIASTOLIC BLOOD PRESSURE: 67 MMHG

## 2018-10-25 VITALS — DIASTOLIC BLOOD PRESSURE: 61 MMHG | SYSTOLIC BLOOD PRESSURE: 107 MMHG

## 2018-10-25 VITALS — DIASTOLIC BLOOD PRESSURE: 63 MMHG | SYSTOLIC BLOOD PRESSURE: 110 MMHG

## 2018-10-25 VITALS — SYSTOLIC BLOOD PRESSURE: 116 MMHG | DIASTOLIC BLOOD PRESSURE: 62 MMHG

## 2018-10-25 LAB
ALP SERPL-CCNC: 72 U/L (ref 50–136)
ALT SERPL W/O P-5'-P-CCNC: 41 U/L (ref 16–63)
AMPHETAMINES UR QL SCN>1000 NG/ML: POSITIVE
AMYLASE SERPL-CCNC: 30 U/L (ref 25–115)
AST SERPL-CCNC: 36 U/L (ref 15–37)
BARBITURATES UR QL SCN: NEGATIVE
BASOPHILS # BLD AUTO: 0.1 K/UL (ref 0–8)
BASOPHILS NFR BLD AUTO: 1 % (ref 0–2)
BILIRUB SERPL-MCNC: 0.2 MG/DL (ref 0.2–1)
BUN SERPL-MCNC: 18 MG/DL (ref 7–18)
CHLORIDE SERPL-SCNC: 105 MMOL/L (ref 98–107)
CO2 SERPL-SCNC: 30 MMOL/L (ref 21–32)
COCAINE UR QL SCN: NEGATIVE
CREAT SERPL-MCNC: 1.1 MG/DL (ref 0.6–1.3)
EOSINOPHIL # BLD AUTO: 0.4 K/UL (ref 0–0.7)
EOSINOPHIL NFR BLD AUTO: 5.8 % (ref 0–7)
ETHANOL SERPL-MCNC: < 3 MG/DL (ref 0–0)
GLUCOSE SERPL-MCNC: 49 MG/DL (ref 74–106)
HCT VFR BLD AUTO: 38.6 % (ref 36.7–47.1)
HGB BLD-MCNC: 13.2 G/DL (ref 12.5–16.3)
LIPASE SERPL-CCNC: 96 U/L (ref 73–393)
LYMPHOCYTES # BLD AUTO: 2.6 K/UL (ref 20–40)
LYMPHOCYTES NFR BLD AUTO: 35 % (ref 20.5–51.5)
MAGNESIUM SERPL-MCNC: 2 MG/DL (ref 1.8–2.4)
MCH RBC QN AUTO: 27.8 UUG (ref 23.8–33.4)
MCHC RBC AUTO-ENTMCNC: 34 G/DL (ref 32.5–36.3)
MCV RBC AUTO: 81.5 FL (ref 73–96.2)
MONOCYTES # BLD AUTO: 0.7 K/UL (ref 2–10)
MONOCYTES NFR BLD AUTO: 9.2 % (ref 0–11)
NEUTROPHILS # BLD AUTO: 3.7 K/UL (ref 1.8–8.9)
NEUTROPHILS NFR BLD AUTO: 49 % (ref 38.5–71.5)
OPIATES UR QL SCN: POSITIVE
PCP UR QL SCN>25 NG/ML: NEGATIVE
PLATELET # BLD AUTO: 249 K/UL (ref 152–348)
POTASSIUM SERPL-SCNC: 3.7 MMOL/L (ref 3.5–5.1)
RBC # BLD AUTO: 4.74 MIL/UL (ref 4.06–5.63)
THC UR QL SCN>50 NG/ML: POSITIVE
TSH SERPL DL<=0.005 MIU/L-ACNC: 2.6 MIU/ML (ref 0.36–3.74)
WBC # BLD AUTO: 7.5 K/UL (ref 3.6–10.2)
WS STN SPEC: 7.6 G/DL (ref 6.4–8.2)

## 2018-10-25 RX ADMIN — QUETIAPINE SCH MG: 200 TABLET, FILM COATED ORAL at 20:24

## 2018-10-25 RX ADMIN — Medication SCH MG: at 14:45

## 2018-10-25 RX ADMIN — Medication SCH EACH: at 20:42

## 2018-10-25 RX ADMIN — DIAZEPAM PRN MG: 10 TABLET ORAL at 20:36

## 2018-10-25 RX ADMIN — DIAZEPAM PRN MG: 10 TABLET ORAL at 14:12

## 2018-10-25 RX ADMIN — GABAPENTIN SCH MG: 300 CAPSULE ORAL at 14:45

## 2018-10-25 RX ADMIN — GABAPENTIN SCH MG: 300 CAPSULE ORAL at 17:29

## 2018-10-25 RX ADMIN — THERA TABS SCH UDTAB: TAB at 09:00

## 2018-10-25 RX ADMIN — Medication SCH EACH: at 15:38

## 2018-10-25 RX ADMIN — HYDROXYZINE PAMOATE PRN MG: 25 CAPSULE ORAL at 02:39

## 2018-10-25 RX ADMIN — CLONIDINE HYDROCHLORIDE PRN MG: 0.1 TABLET ORAL at 14:08

## 2018-10-25 RX ADMIN — ARIPIPRAZOLE SCH MG: 5 TABLET ORAL at 20:24

## 2018-10-25 RX ADMIN — METHOCARBAMOL TABLETS PRN MG: 750 TABLET, COATED ORAL at 14:08

## 2018-10-25 NOTE — NUR
Valium 20 mg PO/Subutex 4 mg SL given:

COWS 14/CIWA 16, patient presented with increased gross tremors, diaphoresis, increased 
anxiety, agitation, myalgia, restlessness, fatigue and malaise. Medicated patient with 
Valium 20 mg PO and Subutex 4 mg SL as ordered. Will monitor for effectiveness. 

-------------------------------------------------------------------------------

Addendum: 10/25/18 at 1415 by GRETTA LINDSEY LVN

-------------------------------------------------------------------------------

Clonidine 0.1mg PO and Robaxin 750 mg PO was also administered.

## 2018-10-25 NOTE — NUR
Admission Note  

Patient is a 23 year old male admitted on 10/25/2018 at 0025. Patient is here at [facility] for medically supervised benzo and opioid withdrawal. He is awake, alert, and 
oriented x4. Patient reports he has been using Xanax, Klonopin, Heroin, and Cocaine. Patient 
is currently presenting intoxicated and is able to answer questions regarding past medical 
history. Patient has a history of ADHD, Bipolar, Depression, and Anxiety. Initial CIWA is 4 
and COWS is 7. Patient also admits smoking 1 pack a day of cigarettes since age 17 years 
old. Patient follows a regular diet at home and is allergic to Kiwi. Patient is a full code. 




Patient reports typical withdrawal symptoms consist of hot and cold flashes, seizures, 
strong skin stimulation, shakings, tremors and auditory hallucination. Patient reports he 
has a seizure on 10/20/2018 as a result from Xanax withdrawal. Patient denies withdrawal 
induced cardiac complications, and withdrawal induced delirium. Patient admits overdosing 
multiple times and the most recent was on 10/19/2018 and medical treatment was received at 
[facility]. Patient did not want to disclose more information for overdosing 
and blackouts. 



Substance Abuse History: 



1) Xanax: Patient stated that he was using about 12 mg PO daily for 2 weeks. He first 
started using when he was 17 years old. Patient stated that his last use was 10 mg PO today 
10/24/2018 at 1800 and is currently intoxicated.  



2) Klonapin: Patient stated he was using 2 mg-10 mg PO daily for 5 weeks. He stated he 
starting using when he was 17 years old. Patient last used was 2mg PO on 10/23/2018. 



3) Heroin: Patient stated that he was using 1 grams IV daily for 3 weeks. He started using 
at the age of 18. Patient stated that his last use was of 1mg IV today 10/24/2018 at 1200.  



4) Cocaine: Patient stated he was using 0.5 gram IV for 2 weeks. He stated he was been suing 
since he was 13 years old. Patient states he last used 0.5 gram IV on 10/20/2018.  



Patient stated that he has been to multiple treatment centers and has been unsuccessful. He 
reports that he has been to over 10 treatment centers including [facility] 3 times. 
His most recent treatment center was on the end of September 2018 in a residential living 
called [facility], he reports he was there for 10 days. Patient says he relapse right 
after he left the treatment center. Patients PCP is Migue Ramirez. He denies currently 
having a psychiatrist. Patient has been prescribed medications for her current diagnoses as 
followed Seroquel 200 mg HS, Amplified, and Vitamin B12. Patient did not bring any home 
medications. Patient denies family history of drug use. Patient states that he graduate high 
school and he is currently self-employed. Patient has never been on a 5150. Patient admits 
that due to his substance abuse, he has faced legal consequences that have placed him in 
corrective facilities. When asked why he wanted to stop using, he stated "I am facing 
multiple charges and mistakes are so high, I just want to be clean to work on new projects. 
I dont want to be paranoid every time I see a , thinking they will arrest me. Patient 
added: I want a sober life.



Patient is 6'2 and weights 183 lbs per standing scale. Patient's skin is intact, dry, and 
warm to touch. Patient has a small laceration on left finger nail bed, pictures were taken. 
Patient also has a small laceration on the back on the head, will endorse to day time nurse 
to take photo. Capillary refill is <3 seconds. PERRLA is present with pupils at 4 mm 
bilaterally. Lungs are clear to auscultation bilaterally. Abdomen is soft and non-distended 
and bowel sounds are present in all 4 quadrants. Patient reports that her last bowel 
movement was on 10/24/2018. Initial vital signs are as follows: /79, , RR 18, T 
98.6, O2Sat 96% on RA. Patient denies any pain. Breathing is even and unlabored. No signs 
and symptoms of respiratory distress. 



Patient was oriented to unit at 0025. Patient was provided instructions regarding unit 
policies and rules. He provided a urine sample at intake office; blood work is still 
pending. Fall and seizure precautions initiated and maintained. Safety measures in place, 
bed in low and locked position, side rails up x2, and call light within reach. Will continue 
to monitor.

## 2018-10-25 NOTE — NUR
PRN Benadryl and Vistaril Reassessment 

Patient is noted in bed resting with eyes closes, breathing is even and unlabored. 
Medication was noted to be successful. Safety measures in place, will continue to monitor.

## 2018-10-25 NOTE — NUR
End of Shift Notes:

Patient was on PRNs today to manage symptoms related to opiate and BZO withdrawal.  However, 
he will start his 3-day Valium and 3-day Subutex taper tomorrow AM. VS monitored closely. No 
significant abnormalities noted. Withdrawal symptoms were closely monitored. 0800 COWS/CIWA 
deferred due to patient as hypersomnolent and was asleep until 1230.  At 1300, patient woke 
up and presented with  restlnessness, chills, hot flashes, gross tremors, nasal stuffiness, 
myalgia and generalized discomfort resulting to a COWS of 14 and CIWA of 16. Medicated 
patient with Clonidine, Subutex 4 mg SL, Valium 20 mg and Robaxin at 1412. Last COWS 10/CIWA 
12.  Patient was unable to participate in group and activities due to his withdrawal 
symptoms. Labs drawn and noted with BS of 49CL. 10 oz of OJ given and notified MD with 
orders to start patient on accucheck x 24 hours with no insulin coverage. Last BS at 1530 
was 107. No s/s of hypoglycemia noted.  All needs met and attended. Will continue to monitor 
closely.

## 2018-10-25 NOTE — NUR
PRN RE-ASSESSMENT

CIWA= 13.  Patent resting on the bed , and appears less anxious, and less agitated.   
Patient reports, "Valium help to  feeling better".   PRN Valium 20 mg PO administered at 
2010 for CIWA=17 was effective.    Safe and calm environment provide.  All needs met.  
Safety measures in place: Call light within reach, bed is in lowest position and locked, 
padded bed rails up x2.  Will continue to monitor closely.

## 2018-10-25 NOTE — NUR
COWS/CIWA Assessment:

Patient woke up and reports complain of fatigue, facial flushing, restlessness, myalgia, 
diaphoresis, myalgia of 6 out of 10, chills, hot flashes, malaise and generalized 
discomfort. COWS 14/CIWA 14. Seen by Dr. Aldridge at this time. Patient will be starting a 
3-day Valium and 3-day Subutex taper tomorrow AM.

## 2018-10-25 NOTE — NUR
COWS/CIWA Assessment:

COWS 10/CIWA 12. Patient complains of fatigue, and generalized discomfort. Facial flushing, 
restlessness, myalgia, diaphoresis, chills, hot flashes, malaise and hypersomnolence noted . 
Will continue to monitor.

## 2018-10-25 NOTE — NUR
Initial COWS and CIWA 

Patient was presenting with anxiety, agitation, and restlessness. Patients COWs was 7 and 
CIWA 4. Safety measures in please, bed lock in low position, side rails up x2, and call 
light within reach. Will continue to monitor.

## 2018-10-25 NOTE — NUR
Re-assessment: Subutex

COWS 10, patient appears less anxious, less restless. He continues to appear worried, 
irritable and diaphoretic. PRN Subutex effective in reducing patient's withdrawal symptoms.

## 2018-10-25 NOTE — NUR
START OF SHIFT NOTE:

Endorsed patient, 23 year male, presented for Benzodiazepines and Opioid withdrawal.  
Currently patient is on ordered PRN Medications.  Tomorrow will be first scheduled day for 
ordered 4 Day Valium and 3 Days Subutex Tapers.  Withdrawal symptoms will be closely 
monitoring.  Patient is alert and oriented x4, presented with  liable mood, and anxious 
affect.  The most recent COWS=10 at 1600, CIWA=12 at 1600: Patient noted with moderate 
symptoms of withdrawal such as anxiety, agitation, irritability, nervousness, nasal 
congestion, generalized mild body aches, abdominal cramps, bilateral tremors, that can be 
felt  but not observe, restlessness, and fatigue.  PRN Medications was not administered 
during day shift were effective, per day shift nurse report.   Patient encouraged to attend 
group activities.   Encouraged to intake fluids as tolerated.   Safe and calm environment 
provide.  All needs met.  Safety measures in place: Call light within reach, bed is in 
lowest position and locked, padded bed rails up x2.  Will continue to monitor closely.

## 2018-10-25 NOTE — NUR
PRN Benadryl and Vistaril 

Patient was presenting with difficulty falling asleep, and anxiety. Adminsitered PRN 
Benadryl, Clonidine, and Vistaril and was well tolerated. Will monitor patient for any s/s 
of adverse effects. Safety measures in place, will continue to monitor.

## 2018-10-25 NOTE — NUR
BS result:

Patient's blood sugar 107 per accucheck. No changes in LOC noted. Notified MD. JEAN made.

## 2018-10-25 NOTE — NUR
COWS/CIWA ASSESSMENT

COWS=16, CIWA=17.  Patient presented with moderate symptoms of withdrawal such as anxiety, 
agitation, irritability, nervousness, nasal congestion, generalized mild body aches, 
abdominal cramps, bilateral tremors, restlessness, fatigue., and yawning.  PRN Valium  20 mg 
 PO will be administered  for CIWA=17 as ordered.   Encouraged to intake fluids as 
tolerated.   Re-assessment will be done in one hour.  Safe and calm environment provide.  
All needs met.  Safety measures in place: Call light within reach, bed is in lowest position 
and locked, padded bed rails up x2.  Will continue to monitor closely.

## 2018-10-25 NOTE — NUR
critical lab value: received a call with blood glucose of 49, Dr. Aldridge aware and ordered to 
give pt orange juice

## 2018-10-25 NOTE — NUR
PRN VALIUM PO ADMINISTRATION

PRN Valium 20 mg PO administered at 2036  for CIWA=17.  Patient tolerated well.  Encouraged 
to intake fluids as tolerated.   Re-assessment will be done in one hour.  Safe and calm 
environment provide.  All needs met.  Safety measures in place: Call light within reach, bed 
is in lowest position and locked, padded bed rails up x2.  Will continue to monitor closely.

## 2018-10-25 NOTE — NUR
Pre-admission note 

Patient is a 23 year old male seen at intake office and he was alert oriented to time, 
place, and situation. Patient is fully ambulatory with a steady gait. Patient is anxious, 
agitated and restless. Patient is presenting intoxicated. Patient is here for Benzo and 
Opiate withdrawal. Patient reported he used Xanax and Heroin today 10/24/2018. Patient has a 
history of ADHD, Bipolar, Depression, and Anxiety. Initial vital signs are: /79, HR 
102, RR 18, T 98.6, and O2 sat 96% on room air, patient denies pain. Will continue plan of 
care upon arrival on unit.

## 2018-10-25 NOTE — NUR
Start of Shift Notes:

Patient is a 23 year old male admitted during the night for BZO and opiate withdrawal. No 
taper ordered at this time. Per night nurse, patient was given PRN Benadryl and Vistaril. 
Last COWS 7 and CIWA 4. Slept for a total of 5 hours. Received patient in his room. Appears 
sedated, and hypersomnolent. Opens his eyes then falls back asleep. He is unable to carry on 
a conversation due to being sleep. Refused AM labs at this time. Educated patient on his 
current plan of care for the day and his medication regimen. Encouraged oral fluid intake 
and encouraged group participation to learn new skills to prevent relapse. All needs met and 
attended. Will continue to monitor closely.

## 2018-10-25 NOTE — NUR
Accucheck order:

Patient will be on accucheck x 24 hours due to CL BS of 49. Patient will not be on any 
insulin. He continues to appear somnolent but arousable to his name and with tactile 
stimuli. No changes in LOC noted. No AV hallucinations noted.

## 2018-10-25 NOTE — NUR
ACCU-CHECK

Accu-Check  done as ordered.  BS =106 mg/dl.   Safe and calm environment provide.  All needs 
met.  Safety measures in place: Call light within reach, bed is in lowest position and 
locked, padded bed rails up x2.  Will continue to monitor closely.

## 2018-10-25 NOTE — NUR
MVI 1 tab at 0900 not administered/COWS/CIWA Deferred:

Patient continues to appear sedated. Arousable to touch then falls back asleep. RR 16. O2 
sat 96% via RA. MVI held. COWS/CIWA Deferred at this time.

## 2018-10-25 NOTE — NUR
Re-assessment: Valium, Clonidine, Robaxin

CIWA 12. Patient appears more calm. He states intermittent perspiration are less. He reports 
pain level is now a 3 out of 10. PRN Valium, Clonidine and Robaxin were effective in 
reducing his withdrawal symptoms. Will continue to monitor.

## 2018-10-25 NOTE — NUR
CIWA and COWS Deferred 

Patient was noted in bed resting with eyes closed, breathing even and unlabored. Per 
protocol CIWA and COWS is to be assessed while patient is awake. Safety measures in please, 
bed lock in low position, side rails up x2, and call light within reach. Will continue to 
monitor.

## 2018-10-25 NOTE — NUR
End of shift 

Patient is a 23 year old male admitted on 10/25/2018. Patient is here at [facility] for 
medically supervised Benzo and Opioid withdrawal. Patients last CIWA was 4 and COWS was 7. 
Patient is on Fall and Seizure precautions. Will endorse to day shift nurse to collect photo 
of a small lesion on the back of the head. Patient had PRN Benadryl and Vistaril during this 
shift. Patient is compliant and was intoxicated during admission but was able to answered 
past medical history questions. Patient slept for 5 hours and had a total intake of 709 ml. 
Patient voided x1 and had no bowel movements during this shift. Safety measures in please, 
bed lock in low position, side rails up x2, and call light within reach. Will endorse to day 
shift.

## 2018-10-26 VITALS — SYSTOLIC BLOOD PRESSURE: 116 MMHG | DIASTOLIC BLOOD PRESSURE: 73 MMHG

## 2018-10-26 VITALS — SYSTOLIC BLOOD PRESSURE: 105 MMHG | DIASTOLIC BLOOD PRESSURE: 59 MMHG

## 2018-10-26 VITALS — SYSTOLIC BLOOD PRESSURE: 120 MMHG | DIASTOLIC BLOOD PRESSURE: 69 MMHG

## 2018-10-26 VITALS — DIASTOLIC BLOOD PRESSURE: 65 MMHG | SYSTOLIC BLOOD PRESSURE: 113 MMHG

## 2018-10-26 RX ADMIN — QUETIAPINE SCH MG: 200 TABLET, FILM COATED ORAL at 20:40

## 2018-10-26 RX ADMIN — CLONIDINE HYDROCHLORIDE PRN MG: 0.1 TABLET ORAL at 05:20

## 2018-10-26 RX ADMIN — Medication SCH MG: at 08:08

## 2018-10-26 RX ADMIN — BUPRENORPHINE HYDROCHLORIDE SCH MG: 2 TABLET SUBLINGUAL at 20:41

## 2018-10-26 RX ADMIN — GABAPENTIN SCH MG: 300 CAPSULE ORAL at 13:15

## 2018-10-26 RX ADMIN — DIAZEPAM SCH MG: 10 TABLET ORAL at 08:08

## 2018-10-26 RX ADMIN — BUPRENORPHINE HYDROCHLORIDE SCH MG: 2 TABLET SUBLINGUAL at 08:08

## 2018-10-26 RX ADMIN — METHOCARBAMOL TABLETS PRN MG: 750 TABLET, COATED ORAL at 10:55

## 2018-10-26 RX ADMIN — DIAZEPAM SCH MG: 10 TABLET ORAL at 20:39

## 2018-10-26 RX ADMIN — THERA TABS SCH UDTAB: TAB at 08:08

## 2018-10-26 RX ADMIN — Medication SCH EACH: at 07:30

## 2018-10-26 RX ADMIN — ARIPIPRAZOLE SCH MG: 5 TABLET ORAL at 20:40

## 2018-10-26 RX ADMIN — Medication PRN MG: at 10:55

## 2018-10-26 RX ADMIN — GABAPENTIN SCH MG: 300 CAPSULE ORAL at 08:08

## 2018-10-26 RX ADMIN — GABAPENTIN SCH MG: 300 CAPSULE ORAL at 17:36

## 2018-10-26 NOTE — NUR
Start Of Shift

Patient is a 23yr old male who was admitted to [facility] on 10/24/18 for a medically 
supervised withdrawal from Benzodiazepines( Xanax and Klonopin), Opiates ( Heroin) and also 
states he was using Cocaine, he has been placed on a 3 day Valium and 3 day Subutex taper 
starting today. PRN medications given on PM shift: Valium 20mg Po and Clonidine, he slept 
for 6 hrs and last COWS was 11 and CIWA 12. Currently he is in bed asleep, breathing even 
and unlabored, side rails up and call light within reach. Continue to follow MD plan of care 
and offer support and encouragement.

## 2018-10-26 NOTE — NUR
COWS 14 

Patient presents with diaphoresis, chills, body aches, decreased appetite,tremors, stuffy 
nose, agitation and increased anxiety

Subutex 4mg SL given

## 2018-10-26 NOTE — NUR
COWS/CIWA ASSESSMENT

COWS=12, CIWA=11: Patient experienced moderate symptoms of withdrawal such as anxiety, 
agitation, irritability,  nervousness, nasal congestion, generalized mild body aches, 
stomach cramps,  bilateral tremors, restlessness, sweating, myalgia, and fatigue.   
Encouraged to intake fluids as tolerated.   Safe and calm environment provide.  All needs 
met.  Safety measures in place: Call light within reach, bed is in lowest position and 
locked, padded bed rails up x2.  Will continue to monitor closely.

## 2018-10-26 NOTE — NUR
COWS 14 CIWA 17

Patient presents with increased anxiety and irritability, restlessness, diaphoresis , chills 
, agitation and lethargy

Valium 20mg PO, Robaxin and Motrin were given @ 1055 with positive results

## 2018-10-26 NOTE — NUR
START OF SHIFT



Patient received lying in bed with eyes closed with respirations even and unlabored. Per 
endorsement, patient is detoxing from Xanax, Klonopin, heroin, and cocaine and is on a 3 day 
Valium and 3 day Subutext Taper. Last COWS is 11 and last CIWA is 14 at 2000. Upon 
assessment, patient awoke and was anxious, depressed, lethargic, and appeared disheveled. 
Patient is avoidant with conversation. No complaints of pain or distress. Will continue to 
monitor.

## 2018-10-26 NOTE — NUR
VS REFUSED, COWS/CIWA DEFERRED

VS refused and COWS/CIWA deferred for sleep.   Patient will be assessed when patient is 
awake.  Respirations are unlabored and even.  RR=14.  Safe and calm environment provided.  
All needs met.  Safety measures in place: Call light within reach, bed is locked in the 
lowest position, and  padded bed rails up bilaterally.  Will continue to monitor.

## 2018-10-26 NOTE — NUR
CIWA 22

Patient presents with sensitivity to light and sound, headache, body aches, decreased 
appetite, restlessness, diaphoresis/chills, generalized discomfort and pins and needles

Valium 20mg PO given per CIWA > 16 protocol

Robaxin 750mg PO and Motrin 400mg PO given for generalized aches 7/10

## 2018-10-26 NOTE — NUR
VS REFUSED, COWS AND CIWA DEFERRED

VS refused and COWS/CIWA deferred for sleep.   Patient will be assessed when patient is 
awake.  Respirations are unlabored and even.  RR=14.  Safe and calm environment provided.  
All needs met.  Safety measures in place: Call light within reach, bed is locked in the 
lowest position, and  padded bed rails up bilaterally.  Will continue to monitor.

## 2018-10-26 NOTE — NUR
END OF SHIFT NOTE:

This report given for patient, 23 year male, scheduled  to start today 4 Day Valium and 3 
Days Subutex Tapers ordered for Benzodiazepines and Opioid withdrawal.  Patient is alert and 
oriented x4, noted with  liable mood, and anxious affect.  VS and withdrawal symptoms was 
closely monitored.  COWS=16, CIWA=17 at 2000.  The last COWS=12, CIWA=11 at 0515: Patient 
experienced moderate symptoms of withdrawal such as anxiety, agitation, irritability,  
nervousness, nasal congestion, generalized mild body aches, stomach cramps,  bilateral 
tremors, restlessness, sweating, myalgia, fatigue, and yawning.  PRN Valium 20 mg PO 
administered for CIWA=17 at 2010, PRN Clonidine 0.1 mg PO administered at 0520 for anxiety, 
and were effective.  Accu-Check  done as ordered.  BS =106 mg/dl.  Patient remains compliant 
with treatment, medications, and diet regimen.  Patient slept for 6 hours, intake 1,236 ml, 
voided x1.  Encouraged to intake fluids as tolerated.  Encouraged to attend group 
activities.  Safe and calm environment provide.  All needs met.  Safety measures in place: 
Call light within reach, bed is in lowest position and locked, padded bed rails up x2.   
Endorsed to day shift nurse.

## 2018-10-26 NOTE — NUR
PRN  RE-ASSESSMENT

Patient is sleeping on his side.  Respirations are even and unlabored. PRN Clonidine  0.1 mg 
PO administered for anxiety at 0520 was  effective.  Safe and calm environment provide.  All 
needs met.  Safety measures in place: Call light within reach, bed is in lowest position and 
locked, padded bed rails up x2.  Will continue to monitor closely.

## 2018-10-26 NOTE — NUR
PRN CLONIDINE PO ADMINISTRATION

PRN Clonidine 0.1 mg PO administered at 0520 for anxiety as ordered.  Patient tolerated 
well.  Encouraged to intake fluids as tolerated.  Safe and calm environment provide.  All 
needs met.  Safety measures in place: Call light within reach, bed is in lowest position and 
locked, padded bed rails up x2.  Will continue to monitor closely.

## 2018-10-26 NOTE — NUR
End Of Shift

Patient is a 23 yr old male who was admitted to [facility] on 10/24/18 for a medically 
supervised withdrawal from Benzo's and Opiates, he continues on a 3 day Valium/Subutex 
taper. PRN medications given this shift: Valium 20mg PO, Subutex 4mg SL, Robaxin and Motrin, 
he had a fluid intake of 1300ML, 3 voids and 1 BM, his last COWS was 14 and CIWA 17. He 
presents with extreme fatigue, lethargy, anhedonia, sensitivity to light and sound and 
irritability. Continue to follow MD plan of care, endorsed to night shift.

## 2018-10-26 NOTE — NUR
COWS 12 CIWA 14

Patient presents with increased anxiety and irritability, restlessness, diaphoresis , chills 
, agitation and lethargy

Scheduled taper started with 10mg PO Valium and 4mg SL Subutex

## 2018-10-26 NOTE — NUR
PRN Reassess

PRN Valium 20mg PO, Robaxin 750mg PO and Motrin 400mg PO given for increased anxiety, 
irritability, agitation and myalgias were effective per pt report

## 2018-10-26 NOTE — NUR
COWS 14 CIWA 17

Patient presents with increased anxiety and irritability, restlessness, diaphoresis , chills 
, agitation and lethargy

## 2018-10-26 NOTE — NUR
PRN Subutex

Subutex 4mg SL given for COWS 14 

Patient presents with diaphoresis, chills, body aches, decreased appetite, agitation and 
increased anxiety

## 2018-10-27 VITALS — DIASTOLIC BLOOD PRESSURE: 60 MMHG | SYSTOLIC BLOOD PRESSURE: 106 MMHG

## 2018-10-27 VITALS — DIASTOLIC BLOOD PRESSURE: 62 MMHG | SYSTOLIC BLOOD PRESSURE: 105 MMHG

## 2018-10-27 VITALS — DIASTOLIC BLOOD PRESSURE: 63 MMHG | SYSTOLIC BLOOD PRESSURE: 102 MMHG

## 2018-10-27 VITALS — SYSTOLIC BLOOD PRESSURE: 111 MMHG | DIASTOLIC BLOOD PRESSURE: 63 MMHG

## 2018-10-27 VITALS — SYSTOLIC BLOOD PRESSURE: 114 MMHG | DIASTOLIC BLOOD PRESSURE: 60 MMHG

## 2018-10-27 VITALS — DIASTOLIC BLOOD PRESSURE: 63 MMHG | SYSTOLIC BLOOD PRESSURE: 107 MMHG

## 2018-10-27 PROCEDURE — HZ31ZZZ INDIVIDUAL COUNSELING FOR SUBSTANCE ABUSE TREATMENT, BEHAVIORAL: ICD-10-PCS

## 2018-10-27 PROCEDURE — HZ41ZZZ GROUP COUNSELING FOR SUBSTANCE ABUSE TREATMENT, BEHAVIORAL: ICD-10-PCS

## 2018-10-27 RX ADMIN — DIAZEPAM SCH MG: 5 TABLET ORAL at 14:27

## 2018-10-27 RX ADMIN — Medication SCH MG: at 08:53

## 2018-10-27 RX ADMIN — GABAPENTIN SCH MG: 300 CAPSULE ORAL at 16:58

## 2018-10-27 RX ADMIN — GABAPENTIN SCH MG: 300 CAPSULE ORAL at 08:53

## 2018-10-27 RX ADMIN — BUPRENORPHINE HYDROCHLORIDE SCH MG: 2 TABLET SUBLINGUAL at 14:27

## 2018-10-27 RX ADMIN — THERA TABS SCH UDTAB: TAB at 08:53

## 2018-10-27 RX ADMIN — DIAZEPAM SCH MG: 5 TABLET ORAL at 08:53

## 2018-10-27 RX ADMIN — ARIPIPRAZOLE SCH MG: 5 TABLET ORAL at 21:58

## 2018-10-27 RX ADMIN — QUETIAPINE SCH MG: 200 TABLET, FILM COATED ORAL at 21:58

## 2018-10-27 RX ADMIN — BUPRENORPHINE HYDROCHLORIDE SCH MG: 2 TABLET SUBLINGUAL at 08:53

## 2018-10-27 RX ADMIN — BUPRENORPHINE HYDROCHLORIDE SCH MG: 2 TABLET SUBLINGUAL at 21:58

## 2018-10-27 RX ADMIN — GABAPENTIN SCH MG: 300 CAPSULE ORAL at 12:18

## 2018-10-27 RX ADMIN — DIAZEPAM SCH MG: 5 TABLET ORAL at 21:58

## 2018-10-27 NOTE — NUR
COWS/CIWA Assessment;



Patient's current CIWA score is 11 and COWS score of 10 manifested by anxiety, guarded, 
patient is complaining of muscle aches, generalized discomfort, fatigue, generalized body 
aches, stomach cramps. Will continue to monitor patient.

## 2018-10-27 NOTE — NUR
COWS/CIWA Assessment;



Patient CIWA score continues to be 11 and COWS score of 10 manifested by anxiety, guarded, 
patient is complaining of muscle aches, generalized discomfort, fatigue, generalized body 
aches, stomach cramps. Will continue to monitor patient.

## 2018-10-27 NOTE — NUR
START OF SHIFT



Received 23 year old male patient admitted on 10/24/18 for Benzodiazepine and Opiate 
withdrawal. Pt is alert and oriented x4. Pt noted with anxiety, fatigue and restlessness. Pt 
is receiving a 3 day Valium and 3 day Subutex taper and tolerating well. He did not receive 
or request PRN medications. Last COWS:10, CIWA:10. Breathing is even and unlabored, safety 
measures in place. Will continue to monitor.

## 2018-10-27 NOTE — NUR
COWS/CIWA Assessment;



Patient CIWA score continues to be 10 and COWS score of 10 manifested by anxiety, guarded, 
patient is complaining of muscle aches, generalized discomfort, fatigue, generalized body 
aches, stomach cramps. Will continue to monitor patient.

## 2018-10-27 NOTE — NUR
COWS/CIWA



Pt complains of chills, sweats, body aches, anxiety, agitation, and restlessness. COWS:9, 
CIWA:11 prior to 2100 medication administration. Will continue to monitor.

## 2018-10-27 NOTE — NUR
COWS=10, CIWA=11



Patient continues to be anxious, melancholic and is avoidant with conversation. Patient 
often is observed to be starting to the ceiling. Patient noted with tremors and verbalized 
feeling fatigue.

## 2018-10-27 NOTE — NUR
Start of shift note;



Received report from night nurse. Patient is 23 year old male admitted on 10/24/18 for 
Benzodiazepine/ Opiate withdrawal. Patient was placed on 3 day Valium and 3 day Subutex 
taper, tolerating well. Patient is AOX4, appears anxious, agitated, complaining of muscle 
aches, tremors, generalized body aches, denies nausea, complains of stomach cramps.All 
safety measures secured. Will continue to monitor patient.

## 2018-10-27 NOTE — NUR
COWS=10, CIWA=12



Patient continues to be anxious, depressed and isolative. Patient noted to have tremors and 
avoids eye contact.

## 2018-10-27 NOTE — NUR
END OF SHIFT



Patient is noted lying in bed with eyes closed and with even and unlabored respirations. No 
S/S of pain or c/o distress noted. No PRN medications given throughout shift. Patient 
continues to present with slight tremors, anxiety, and flat affect. Patient slept for 8 
hours during the night. Last COWS is 10 and last CIWA is 11. Endorsed to oncoming AM nurse.

## 2018-10-27 NOTE — NUR
End of shift note;



Patient is AXO4, presented with anxiety, poor eye contact,  ,guarded, patient is complaining 
of muscle aches, generalized discomfort, fatigue. Patient remained compliant with treatment 
plan and medication regime. Patient participated in group activities and therapies.  
Medications were effective in reducing withdrawal symptoms. Patient's last COWS 10 and CIWA 
score is 10 at 1600. All safety measures secured. Met all needs.

## 2018-10-28 VITALS — SYSTOLIC BLOOD PRESSURE: 112 MMHG | DIASTOLIC BLOOD PRESSURE: 64 MMHG

## 2018-10-28 VITALS — SYSTOLIC BLOOD PRESSURE: 108 MMHG | DIASTOLIC BLOOD PRESSURE: 66 MMHG

## 2018-10-28 VITALS — DIASTOLIC BLOOD PRESSURE: 67 MMHG | SYSTOLIC BLOOD PRESSURE: 117 MMHG

## 2018-10-28 VITALS — SYSTOLIC BLOOD PRESSURE: 121 MMHG | DIASTOLIC BLOOD PRESSURE: 78 MMHG

## 2018-10-28 RX ADMIN — ARIPIPRAZOLE SCH MG: 5 TABLET ORAL at 22:09

## 2018-10-28 RX ADMIN — DIAZEPAM SCH MG: 5 TABLET ORAL at 09:45

## 2018-10-28 RX ADMIN — HYDROXYZINE PAMOATE PRN MG: 25 CAPSULE ORAL at 16:33

## 2018-10-28 RX ADMIN — CLONIDINE HYDROCHLORIDE PRN MG: 0.1 TABLET ORAL at 22:09

## 2018-10-28 RX ADMIN — DIAZEPAM SCH MG: 5 TABLET ORAL at 22:10

## 2018-10-28 RX ADMIN — METHOCARBAMOL TABLETS PRN MG: 750 TABLET, COATED ORAL at 16:33

## 2018-10-28 RX ADMIN — GABAPENTIN SCH MG: 300 CAPSULE ORAL at 12:30

## 2018-10-28 RX ADMIN — THERA TABS SCH UDTAB: TAB at 09:45

## 2018-10-28 RX ADMIN — GABAPENTIN SCH MG: 300 CAPSULE ORAL at 16:33

## 2018-10-28 RX ADMIN — GABAPENTIN SCH MG: 300 CAPSULE ORAL at 09:45

## 2018-10-28 RX ADMIN — QUETIAPINE SCH MG: 200 TABLET, FILM COATED ORAL at 22:09

## 2018-10-28 RX ADMIN — Medication SCH MG: at 09:45

## 2018-10-28 NOTE — NUR
COWS 10 CIWA 14

Patient presents with lethargy, irritability, decreased appetite, agitation, insomnia, 
myalgias and he has a flat angry affect

Scheduled Gabapentin 600mg PO given

## 2018-10-28 NOTE — NUR
VITALS REFUSED, COWS/CIWA DEFERRED



0000 vitals refused. COWS and CIWA deferred d/t pt lying in bed with eyes closed and is 
noted to be asleep. Breathing is even and unlabored, safety measures in place. Will continue 
to monitor.

## 2018-10-28 NOTE — NUR
PRN

Robaxin 750mg PO given for generalized body aches 7/10

Vistaril 50mg PO given for increased anxiety

## 2018-10-28 NOTE — NUR
COWS 10/ CIWA 14

Patient presents with generalized body aches and increased anxiety and irritability

Vistaril 50mg PO PRN and Robaxin 750mg PO PRN given

## 2018-10-28 NOTE — NUR
End Of Shift      

Patient is a 23 yr old male who was admitted to [facility] on 10/24/18 for a medically 
supervised withdrawal from Benzodiazepines and Opiates, he continues on a 3 day Valium and 3 
day Subutex taper and today is day 3. PRN medications given on this shift: Robaxin and 
Vistaril. His withdrawal symptoms include lethargy, generalized body aches, irritability, 
agitation and restlessness. He had a fluid intake of  1796 ML, 5 Voids and  0 BM, His last 
COWS was 10 and CIWA 14 @ 1600. He has not attended groups today and is isolating in his 
room despite encouragement. Continue to follow MD plan of care and offer support and 
encouragement. Endorsed to night shift.

## 2018-10-28 NOTE — NUR
CIWA and COWS Assessment 

Patient is presenting with the following s/s of withdrawal: mild sweats, tremors, anxiety, 
mild body aches, agitation, pins and needles sensation. Patients CIWA is 14 and COWS is 10. 
Safety measures in place, will continue to monitor.

## 2018-10-28 NOTE — NUR
PRN Clonidine 

Patient was experiencing anxiety and agitation. Administered PRN Clonidine and patient 
tolerated well. Will continue to monitor for s/s of adverse reactions. Breathing is even and 
unlabored. Safety measures in place.

## 2018-10-28 NOTE — NUR
PRN Clonidine Reassessment  

Patient was noted in bed resting watching TV, medication was noted to be effective. 
Breathing is even and unlabored. Safety measures in place.  Will continue to monitor.

## 2018-10-28 NOTE — NUR
Start Of Shift

Patient is a 23 yr old male who was admitted to [facility] on 10/24/18 for a medically 
supervised withdrawal from Benzodiazepines and Opiates, he continues on a 3 day Valium and 3 
day Subutex taper. No PRN medications were given on PM shift, he slept for 8 hours and last 
COWS was 9 and CIWA 11. Currently he is in bed asleep, breathing even and unlabored. 
Continue to follow MD pan of care and offer support and encouragement.

## 2018-10-28 NOTE — NUR
COWS 8 CIWA 13

Patient presents with lethargy, irritability, decreased appetite, agitation, insomnia and he 
has a flat angry affect

Scheduled Valium 5mg PO and Subutex 2mg SL given

## 2018-10-28 NOTE — NUR
VITALS REFUSED, COWS/CIWA DEFERRED



0400 vitals refused. COWS and CIWA deferred d/t pt lying in bed with eyes closed and is 
noted to be asleep. Breathing is even and unlabored, safety measures in place. Will monitor.

## 2018-10-28 NOTE — NUR
END OF SHIFT



Pt is a 23 year old male patient admitted on 10/24/18 for Benzodiazepine and Opiate 
withdrawal. He remains alert and oriented x4. He was noted with anxiety, fatigue, chills, 
body aches and restlessness. He continues on a 3 day Valium and 3 day Subutex taper and 
tolerating well. Pt did not receive or request PRN medications. He slept a total of 8 hrs, 
1200mL, Void: x1, BM:0, Last COWS:9, CIWA:11 at 2000. Breathing is even and unlabored, 
safety measures in place. Endorsed to AM shift.

## 2018-10-29 VITALS — SYSTOLIC BLOOD PRESSURE: 85 MMHG | DIASTOLIC BLOOD PRESSURE: 42 MMHG

## 2018-10-29 VITALS — DIASTOLIC BLOOD PRESSURE: 69 MMHG | SYSTOLIC BLOOD PRESSURE: 106 MMHG

## 2018-10-29 VITALS — SYSTOLIC BLOOD PRESSURE: 118 MMHG | DIASTOLIC BLOOD PRESSURE: 81 MMHG

## 2018-10-29 VITALS — SYSTOLIC BLOOD PRESSURE: 109 MMHG | DIASTOLIC BLOOD PRESSURE: 53 MMHG

## 2018-10-29 VITALS — DIASTOLIC BLOOD PRESSURE: 74 MMHG | SYSTOLIC BLOOD PRESSURE: 125 MMHG

## 2018-10-29 VITALS — SYSTOLIC BLOOD PRESSURE: 93 MMHG | DIASTOLIC BLOOD PRESSURE: 51 MMHG

## 2018-10-29 RX ADMIN — ARIPIPRAZOLE SCH MG: 5 TABLET ORAL at 22:34

## 2018-10-29 RX ADMIN — HYDROXYZINE PAMOATE PRN MG: 25 CAPSULE ORAL at 09:20

## 2018-10-29 RX ADMIN — Medication SCH MG: at 09:19

## 2018-10-29 RX ADMIN — QUETIAPINE SCH MG: 200 TABLET, FILM COATED ORAL at 22:34

## 2018-10-29 RX ADMIN — CLONIDINE HYDROCHLORIDE PRN MG: 0.1 TABLET ORAL at 16:32

## 2018-10-29 RX ADMIN — METHOCARBAMOL TABLETS PRN MG: 750 TABLET, COATED ORAL at 09:20

## 2018-10-29 RX ADMIN — GABAPENTIN SCH MG: 300 CAPSULE ORAL at 09:19

## 2018-10-29 RX ADMIN — HYDROXYZINE PAMOATE PRN MG: 25 CAPSULE ORAL at 22:34

## 2018-10-29 RX ADMIN — THERA TABS SCH UDTAB: TAB at 09:19

## 2018-10-29 RX ADMIN — GABAPENTIN SCH MG: 300 CAPSULE ORAL at 16:32

## 2018-10-29 RX ADMIN — Medication PRN MG: at 09:19

## 2018-10-29 RX ADMIN — CLONIDINE HYDROCHLORIDE PRN MG: 0.1 TABLET ORAL at 22:34

## 2018-10-29 RX ADMIN — HYDROXYZINE PAMOATE PRN MG: 25 CAPSULE ORAL at 16:33

## 2018-10-29 RX ADMIN — METHOCARBAMOL TABLETS PRN MG: 750 TABLET, COATED ORAL at 16:33

## 2018-10-29 RX ADMIN — GABAPENTIN SCH MG: 300 CAPSULE ORAL at 13:03

## 2018-10-29 NOTE — NUR
COWS 8 CIWA 13

Patient presents with lethargy, irritability, decreased appetite, agitation, insomnia, 
myalgias and he has a flat affect

## 2018-10-29 NOTE — NUR
START OF SHIFT NOTE



Rcvd report from outgoing nurse. Pt is a 22 y/o male A/O to person, place, time, and 
purpose. Pt was admitted for medically supervised withdrawal from Benzodiazepines and 
Opiates. Pt completed a 3 day Valium and Subutex taper. Pt is being discharged on 10/31. Pt 
has been presenting w/ anxiety, restlessness, flat affect, body aches, and runny nose. PRN 
Robaxin x2, Vistaril x2, Clonidine, and Motrin were given and noted effective by outgoing 
nurse. Last CIWA 13 and COWS 9 @ 1600. Call light is within reach. Pt will continue to be 
monitored and needs met.

## 2018-10-29 NOTE — NUR
PRN BENADRYL, CLONIDINE, AND VISTARIL REASSESSMENT



Pt is in bed w/ his eyes closed. Pt's respirations are unlabored and even.

## 2018-10-29 NOTE — NUR
COWS 9 CIWA 13/ PRN Medication

Patient presents with lethargy, irritability, decreased appetite, agitation, insomnia, 
myalgias and he has a flat affect

PRN Clonidine 0.1mg PO, Vistaril 50mg PO and Robaxin 750mg PO given

## 2018-10-29 NOTE — NUR
CIWA and COWS Deferred 

Patient was noted in bed resting with eyes closed, breathing was even and unlabored. Per 
protocol CIWA and COWS is to be assessed while awake. Safety measures in place. Will 
continue to monitor.

## 2018-10-29 NOTE — NUR
Start Of Shift

Patient is a 23 yr old male who was admitted to [facility] on 10/24/18 for a medically 
supervised withdrawal from Benzodiazepines and Opiates, he has completed  a 3 day Valium and 
3 day Subutex taper. PRN Clonidine was given on PM shift, he slept for 7+ hours and last 
COWS was 10 and CIWA 14. Currently he is in bed asleep, breathing even and unlabored. 
Continue to follow MD pan of care and offer support and encouragement.

## 2018-10-29 NOTE — NUR
End of shift 

Patient is a 23 year old male admitted on 10/24/2018. Patient is here at [facility] for 
medically supervised Benzos and Opioid withdrawal. Patient is on a 3 day Valium and 3 day 
Subutex. Patients last CIWA is 14 and COWS is 10. Patient is on Fall and Seizure 
precautions. Patient slept for 7 hours and had a total intake 1,600ml. Patient voided x3 and 
had one bowel movement during this shift. Breathing is even and unlabored. Safety measures 
in place, bed locked in low position, side rails up x2 and call light within reach. Will 
endorse to day shift.

## 2018-10-29 NOTE — NUR
End Of Shift      

Patient is a 23 yr old male who was admitted to [facility] on 10/24/18 for a medically 
supervised withdrawal from Benzodiazepines and Opiates, he has completed  a 3 day Valium and 
3 day Subutex taper and will be discharged in the AM to [facility] . PRN 
medications given on this shift: Robaxin x2 , Vistaril x2, Motrin and Clonidine . His 
withdrawal symptoms include lethargy, generalized body aches, irritability, agitation and 
restlessness. He had a fluid intake of  2100 ML, 2 Voids and  0 BM, His last COWS was 9 and 
CIWA 13 @ 1600. He has  attended groups today and is interacting with his peers.  Continue 
to follow MD plan of care and offer support and encouragement. Endorsed to night shift.

## 2018-10-29 NOTE — NUR
CIWA AND COWS ASSESSMENT



CIWA 12 and COWS 9. Pt has been presenting w/ anxiety, restlessness, flat affect, body 
aches, and runny nose. V/S: T:98.1, P:91, RR:17, SPO2:100, BP:125/74.

## 2018-10-29 NOTE — NUR
PRN BENADRYL, CLONIDINE, AND VISTARIL ADMINISTRATION



Benadryl 50mg for sleep, Clonidine 0.1mg for anxiety, and Vistaril for anxiety were given. 
Will reassess pt in 1 hr.

## 2018-10-29 NOTE — NUR
COWS/CIWA / PRN

COWS 8 CIWA 14

Patient complains of myalgias, restlessness, lethargy, stuffy nose , he is irritable and has 
a flat affect

PRN Motrin, Robaxin and Vistaril given

## 2018-10-30 VITALS — DIASTOLIC BLOOD PRESSURE: 54 MMHG | SYSTOLIC BLOOD PRESSURE: 101 MMHG

## 2018-10-30 RX ADMIN — THERA TABS SCH UDTAB: TAB at 09:21

## 2018-10-30 RX ADMIN — Medication SCH MG: at 09:21

## 2018-10-30 RX ADMIN — GABAPENTIN SCH MG: 300 CAPSULE ORAL at 09:21

## 2018-10-30 NOTE — NUR
Start of Shift Notes:

Received endorsement from night nurse. Patient is a 23 year old male admitted for BZO and 
opiate withdrawal. He completed a 4-day Ativan and 3-day Subutex taper successfully. He will 
be discharging today to a sober living facility. Per night nurse, patient was given PRN 
Benadryl, Clonidine and Vistaril during the night. Last COWS 9 and CIWA 12. Slept for a 
total of 9 hours. Received patient in his room. Awake, alert and verbally responsive. 
Oriented x 4. Complains of anxiety, sweats and chills. Denies S/I or H/I noted. No AV 
hallucinations noted. COWS 9/CIWA 10 at this time. Educated patient on the discharge 
process. Verbalized good understanding. All needs met and attended. Will continue to monitor 
closely.

## 2018-10-30 NOTE — NUR
CIWA AND COWS DEFERRED. V/S REFUSED



Pt is in bed w/ his eyes closed. Pt's respirations are unlabored and even.

## 2018-10-30 NOTE — NUR
CIWA AND COWS DEFERRED, V/S REFUSED



Pt is in bed w/ his eeys closed. Pt's respirations are unlabored and even.

## 2018-10-30 NOTE — NUR
END OF SHIFT NOTE



Endorsed pt to oncoming nurse. Pt is a 24 y/o male A/O to person, place, time, and purpose. 
Pt was admitted for medically supervised withdrawal from Benzodiazepines and Opiates. Pt 
completed a 3 day Valium and Subutex taper. Pt is being discharged on 10/31. Pt continues 
presenting w/ anxiety, restlessness, flat affect, body aches, and runny nose. Pt denies any 
S/I or H/I. PRN Benadryl 50mg, Vistaril 50mg, and  Clonidine 0.1mg were given and noted 
effective. Pts fluid intake was 2091ml and he slept for 9hrs. Last CIWA 12 and COWS 9 @ 
2000. Call light is within reach.

## 2018-10-30 NOTE — NUR
Discharged:

VS stable. COWS 9/CIWA 10. Denies S/I or H/I. No AV hallucinations noted. Alert and oriented 
x 4. Patient education provided regarding his discharge instructions. He verbalized good 
understanding. Education provided regarding his prescriptions as well. All clothing, 
valuables were returned to the patient. He did not bring home medications All necessary dc 
paperwork were returned to the patient and placed inside blue and black duffel bag. All 
needs met and attended. Patient was discharged at this time.

## 2020-03-02 ENCOUNTER — HEALTH MAINTENANCE LETTER (OUTPATIENT)
Age: 25
End: 2020-03-02

## 2020-03-28 NOTE — NUR
Start of shift 

Patient is a 23 year old male admitted on 10/24/2018. Patient is here at [facility] for 
medically supervised Benzos and Opioid withdrawal. Patient is on a 3 day Valium and 3 day 
Subutex. Patients last CIWA is 14 and COWS is 10. Patient is on Fall and Seizure 
precautions. Per endorsement patient has been participating in group and had PRN Robaxin and 
Vistaril. Upon round patient was noted in room, he appeared agitated and  anxious. Reviewed 
2100 medications with patient and he verbalized understanding. Breathing is even and 
unlabored, no s/s of distress. Safety measures in place, bed locked in low position, side 
rails up x2 and call light within reach. Will continue to monitor. - - -

## 2020-05-21 ENCOUNTER — OFFICE (OUTPATIENT)
Dept: URBAN - METROPOLITAN AREA CLINIC 13 | Facility: CLINIC | Age: 25
End: 2020-05-21

## 2020-05-21 DIAGNOSIS — B18.2 CHRONIC HEPATITIS C: ICD-10-CM

## 2020-05-21 DIAGNOSIS — R19.7 DIARRHEA (UNSPECIFIED): ICD-10-CM

## 2020-05-21 PROCEDURE — G0406 INPT/TELE FOLLOW UP 15: HCPCS | Performed by: INTERNAL MEDICINE

## 2020-05-21 PROCEDURE — 99203 OFFICE O/P NEW LOW 30 MIN: CPT | Performed by: INTERNAL MEDICINE

## 2020-05-21 NOTE — SERVICEHPINOTES
The patient is scheduled today for a telehealth visit, due to current mandate for social distancing on account of the COVID-19 Pandemic. The clinician is connected to a secure network. The patient consents to this teleconference being a billable service.   This visit used [employer] for a live, interactive audio and video telehealth visit.   Courtney was diagnosed and treated for his hepatitis C in 2017 in [place] and apparently had full response with eradication noted in 2018 as well.  However he continued to have occasional drug use with cocaine and heroin although he does not recall sharing any needles he may have been reinfected sometime after 2018.  Patient does not have any history of jaundice, pruritus, malaise, weight loss, nausea or vomiting.  Bowel movements are typically soft to loose and occurred 3-4 times a day.  These loose bowel movements have been present for the past 4 months and prior to the patient was having more solid stools.  He denies any melena or hematochezia and he has not had any other gastrointestinal problems except for his hepatitis C. he is currently taking a high-protein low carbohydrate diet and denies excessive use of fiber or use of lactose containing foods.BR Patient has been sober since November 2019 and has not used any drugs or alcohol since.  He denies any significant abdominal pain, fever, chills, cough or shortness of breath.  He has not had any abdominal ultrasound in the past couple of years and his recent lab tests revealed normal liver enzymes except for ALT of 63.  CBC is normal and patient's hepatitis C viral load is positive at 4,690,000.

## 2020-05-22 LAB
US EXAM  ABDOM  COMPLETE: (no result)
US EXAM  ABDOM  COMPLETE: NOTE: (no result)

## 2020-06-09 ENCOUNTER — OFFICE (OUTPATIENT)
Dept: URBAN - METROPOLITAN AREA CLINIC 13 | Facility: CLINIC | Age: 25
End: 2020-06-09

## 2020-06-09 DIAGNOSIS — B18.2 CHRONIC HEPATITIS C: ICD-10-CM

## 2020-06-09 DIAGNOSIS — R94.5 LIVER ENZYMES ABNORMAL: ICD-10-CM

## 2020-06-09 PROCEDURE — 99213 OFFICE O/P EST LOW 20 MIN: CPT | Performed by: INTERNAL MEDICINE

## 2020-06-09 PROCEDURE — G0406 INPT/TELE FOLLOW UP 15: HCPCS | Performed by: INTERNAL MEDICINE

## 2020-06-09 NOTE — SERVICEHPINOTES
The patient is scheduled today for a telehealth visit, due to current mandate for social distancing on account of the COVID-19 Pandemic. The clinician is connected to a secure network. The patient consents to this teleconference being a billable service.   This visit used [employer] for a live, interactive audio and video telehealth visit.  Courtney has been feeling fine.  He is exercising regularly and denies any fatigue, abdominal pain, nausea, vomiting, melena or hematochezia.  Patient is not taking any medications but does take a multivitamin daily.  He does not have any jaundice or pruritus and denies any abdominal distention, symptoms of encephalopathy or other associated complications of liver disease.  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  Given recurrent nature of the virus and its resistance to therapy patient will require triple therapy with Vosevi.

## 2020-06-10 ENCOUNTER — OFFICE (OUTPATIENT)
Dept: URBAN - METROPOLITAN AREA CLINIC 13 | Facility: CLINIC | Age: 25
End: 2020-06-10

## 2020-07-28 NOTE — NUR
PRN Reassess

Patient is interacting with his peers, anxiety has decreased , medications effective Benzoyl Peroxide Counseling: Patient counseled that medicine may cause skin irritation and bleach clothing.  In the event of skin irritation, the patient was advised to reduce the amount of the drug applied or use it less frequently.   The patient verbalized understanding of the proper use and possible adverse effects of benzoyl peroxide.  All of the patient's questions and concerns were addressed.

## 2020-11-03 ENCOUNTER — OFFICE (OUTPATIENT)
Dept: URBAN - METROPOLITAN AREA CLINIC 13 | Facility: CLINIC | Age: 25
End: 2020-11-03

## 2020-11-03 DIAGNOSIS — B18.2 CHRONIC HEPATITIS C: ICD-10-CM

## 2020-11-03 PROCEDURE — 99213 OFFICE O/P EST LOW 20 MIN: CPT | Performed by: INTERNAL MEDICINE

## 2020-11-03 PROCEDURE — G0406 INPT/TELE FOLLOW UP 15: HCPCS | Performed by: INTERNAL MEDICINE

## 2020-11-03 NOTE — SERVICEHPINOTES
CODY CABALLERO   returns today for follow-up from last visit on   10/20/2020  .    Cody is doing well and denies any abdominal pain, nausea, jaundice or pruritus.  Bowel movements are occurring daily and is no melena or hematochezia.  Patient completed his Vosevi on 9/26/2020 and did not have any side effects with this medication.  His recent lab tests reveal normal liver enzymes except for mild elevation of AST at 48.  CBC was normal and testosterone level was 465.  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

## 2020-12-14 ENCOUNTER — HEALTH MAINTENANCE LETTER (OUTPATIENT)
Age: 25
End: 2020-12-14

## 2021-04-18 ENCOUNTER — HEALTH MAINTENANCE LETTER (OUTPATIENT)
Age: 26
End: 2021-04-18

## 2021-06-04 ENCOUNTER — HOSPITAL ENCOUNTER (EMERGENCY)
Dept: HOSPITAL 54 - ER | Age: 26
LOS: 1 days | Discharge: HOME | End: 2021-06-05
Payer: COMMERCIAL

## 2021-06-04 VITALS
HEIGHT: 74 IN | DIASTOLIC BLOOD PRESSURE: 75 MMHG | WEIGHT: 205 LBS | SYSTOLIC BLOOD PRESSURE: 125 MMHG | BODY MASS INDEX: 26.31 KG/M2

## 2021-06-04 DIAGNOSIS — L03.113: Primary | ICD-10-CM

## 2021-10-02 ENCOUNTER — HEALTH MAINTENANCE LETTER (OUTPATIENT)
Age: 26
End: 2021-10-02

## 2022-05-14 ENCOUNTER — HEALTH MAINTENANCE LETTER (OUTPATIENT)
Age: 27
End: 2022-05-14

## 2022-08-17 NOTE — NUR
END OF SHIFT



PT IS A/O X4, RESPIRATIONS EVEN AND UNLABORED. PT HAS A FLAT EFFECT, APPEARS ANXIOUS, 
DEPRESSED, AND WITHDRAWN. PT REPORTS HAVING CHILLS, SWEATING, HOT FLASHES, ANXIETY, 
GENERALIZED BODY ACHES, NAUSEA, STOMACH CRAMPS AND RESTLESSNESS. PT REPORTS HAVING AUDITORY 
HALLUCINATIONS; PT STATES, I HEAR SWOOSHING NOISES AND SCRATCHING SOUNDS. TREMORS AND 
PILOERECTION OF SKIN SEEN. DENIES N/V/D. COWS 8 CIWA 7 AT 1600. ENCOURAGED PT TO INCREASE 
FLUIDS FOR HYDRATION. SIDE RAILS UPX2, BED IS IN LOWEST POSITION. CALL LIGHT WITHIN REACH. 
ALLSAFETY MEASURES IN PLACE. WILL GIVE ALL PERTINENT INFO AND ENDORSEMENT TO NIGHT SHIFT 
NURSE. No

## 2022-09-03 ENCOUNTER — HEALTH MAINTENANCE LETTER (OUTPATIENT)
Age: 27
End: 2022-09-03

## 2023-06-03 ENCOUNTER — HEALTH MAINTENANCE LETTER (OUTPATIENT)
Age: 28
End: 2023-06-03

## 2023-12-11 NOTE — NUR
End of Shift.

Pt. is a 24 y/o male admitted for the medically managed withdrawal from Benzodiazepines, 
opiates, and methamphetamine salts. Pt. completed his Subutex and Valium tapers this 
morning. Throughout shift pt. has been focused on his medication. Pt. stated  I dont want 
stop taking Subutex, I want it for maintenance. MD aware and spoke with pt. regarding 
treatment plan. Pt. remains focused on his medication regiment. Pt. encouraged to verbalize 
his concerns and emotions. Pt. continually noted as anxious, irritable, with a depressed 
affect.  Pt. reports feeling chills, and increased anxiety. Educated pt. on treatment plan 
and medication regiment. PRN Motrin Clonidine, and Vistaril given during shift for s/s of 
withdrawal.  Last COWS: 10 and Last CIWA: 10 @1600. Safety measures in place. Will endorse 
pt.s care to oncoming shift. 36.7